# Patient Record
Sex: FEMALE | Race: BLACK OR AFRICAN AMERICAN | Employment: FULL TIME | URBAN - METROPOLITAN AREA
[De-identification: names, ages, dates, MRNs, and addresses within clinical notes are randomized per-mention and may not be internally consistent; named-entity substitution may affect disease eponyms.]

---

## 2017-01-03 ENCOUNTER — HOSPITAL ENCOUNTER (EMERGENCY)
Age: 27
Discharge: HOME | End: 2017-01-03
Attending: EMERGENCY MEDICINE
Payer: SELF-PAY

## 2017-01-03 VITALS
TEMPERATURE: 98.7 F | SYSTOLIC BLOOD PRESSURE: 111 MMHG | HEIGHT: 67 IN | DIASTOLIC BLOOD PRESSURE: 66 MMHG | HEART RATE: 70 BPM | WEIGHT: 195 LBS | BODY MASS INDEX: 30.61 KG/M2 | OXYGEN SATURATION: 98 % | RESPIRATION RATE: 19 BRPM

## 2017-01-03 DIAGNOSIS — R55 SYNCOPE, UNSPECIFIED SYNCOPE TYPE: Primary | ICD-10-CM

## 2017-01-03 DIAGNOSIS — Z91.89 H/O ANXIETY STATE: ICD-10-CM

## 2017-01-03 LAB
ANION GAP BLD CALC-SCNC: 20 MMOL/L (ref 5–15)
BASOPHILS # BLD AUTO: 0 K/UL (ref 0–0.1)
BASOPHILS # BLD: 0 % (ref 0–1)
BUN BLD-MCNC: 9 MG/DL (ref 9–20)
CA-I BLD-MCNC: 1.19 MMOL/L (ref 1.12–1.32)
CHLORIDE BLD-SCNC: 103 MMOL/L (ref 98–107)
CO2 BLD-SCNC: 24 MMOL/L (ref 21–32)
CREAT BLD-MCNC: 0.8 MG/DL (ref 0.6–1.3)
EOSINOPHIL # BLD: 0.1 K/UL (ref 0–0.4)
EOSINOPHIL NFR BLD: 1 % (ref 0–7)
ERYTHROCYTE [DISTWIDTH] IN BLOOD BY AUTOMATED COUNT: 14 % (ref 11.5–14.5)
GLUCOSE BLD-MCNC: 86 MG/DL (ref 65–105)
HCG SERPL QL: NEGATIVE
HCT VFR BLD AUTO: 32.8 % (ref 35–47)
HCT VFR BLD CALC: 36 % (ref 35–47)
HGB BLD-MCNC: 10.3 G/DL (ref 11.5–16)
HGB BLD-MCNC: 12.2 GM/DL (ref 11.5–16)
LYMPHOCYTES # BLD AUTO: 35 % (ref 12–49)
LYMPHOCYTES # BLD: 2.7 K/UL (ref 0.8–3.5)
MCH RBC QN AUTO: 26.7 PG (ref 26–34)
MCHC RBC AUTO-ENTMCNC: 31.4 G/DL (ref 30–36.5)
MCV RBC AUTO: 85 FL (ref 80–99)
MONOCYTES # BLD: 0.5 K/UL (ref 0–1)
MONOCYTES NFR BLD AUTO: 7 % (ref 5–13)
NEUTS SEG # BLD: 4.4 K/UL (ref 1.8–8)
NEUTS SEG NFR BLD AUTO: 57 % (ref 32–75)
PLATELET # BLD AUTO: 310 K/UL (ref 150–400)
POTASSIUM BLD-SCNC: 3.4 MMOL/L (ref 3.5–5.1)
RBC # BLD AUTO: 3.86 M/UL (ref 3.8–5.2)
SERVICE CMNT-IMP: ABNORMAL
SODIUM BLD-SCNC: 142 MMOL/L (ref 136–145)
WBC # BLD AUTO: 7.6 K/UL (ref 3.6–11)

## 2017-01-03 PROCEDURE — 85025 COMPLETE CBC W/AUTO DIFF WBC: CPT | Performed by: EMERGENCY MEDICINE

## 2017-01-03 PROCEDURE — 80047 BASIC METABLC PNL IONIZED CA: CPT

## 2017-01-03 PROCEDURE — 99285 EMERGENCY DEPT VISIT HI MDM: CPT

## 2017-01-03 PROCEDURE — 93005 ELECTROCARDIOGRAM TRACING: CPT

## 2017-01-03 PROCEDURE — 84703 CHORIONIC GONADOTROPIN ASSAY: CPT | Performed by: EMERGENCY MEDICINE

## 2017-01-03 PROCEDURE — 36415 COLL VENOUS BLD VENIPUNCTURE: CPT | Performed by: EMERGENCY MEDICINE

## 2017-01-03 NOTE — ED PROVIDER NOTES
HPI Comments: Kristy Boss is a 31 y.o. female with PMHx significant for panic attacks and anxiety who presents via EMS to the ED for evaluation after having a seizure and syncopal episode at work  PTA. Pt denies remembering passing out at her work or having the seizure. Pt reports that the last thing she remembers was having a panic attack at work due to anxiety and \"personal life issues. \" Per EMS, pt had fallen and was on the ground upon arrival. Pt reports that she used to see a psychiatrist for her anxiety and panic attacks, but notes that she does not follow up anymore for her anxiety or panic attacks. Pt denies currently taking any regular medications. Pt also denies eating or drinking today, noting that she \"went to her first job at 0600, and then was on the way to her second job at ~1200 when her sx's arose. \" Pt reports that she \"felt ok\" during her first job. Pt specifically denies urinary or fecal incontinence, headaches, ABD pain, dysuria, back pain, or neck pain. PCP: Hayden Chang MD      There are no other complaints, changes or physical findings at this time. The history is provided by the patient and the EMS personnel. No  was used. Past Medical History:   Diagnosis Date    Autoimmune disease ([address] Lupus     Psychiatric disorder      panic attacks       Past Surgical History:   Procedure Laterality Date    Pr breast surgery procedure unlisted       cyst removal    Pr abdomen surgery proc unlisted       hernia repair         History reviewed. No pertinent family history. Social History     Social History    Marital status: SINGLE     Spouse name: N/A    Number of children: N/A    Years of education: N/A     Occupational History    Not on file.      Social History Main Topics    Smoking status: Never Smoker    Smokeless tobacco: Not on file    Alcohol use Yes    Drug use: No    Sexual activity: No     Other Topics Concern    Not on file Social History Narrative         ALLERGIES: Penicillins    Review of Systems   Constitutional: Negative for chills and fever. HENT: Negative for congestion and rhinorrhea. Eyes: Negative for visual disturbance. Respiratory: Negative for cough and shortness of breath. Cardiovascular: Negative for chest pain. Gastrointestinal: Negative for abdominal pain, nausea and vomiting. No fecal incontinence. Genitourinary: Negative for difficulty urinating and dysuria. No urinary incontinence. Musculoskeletal: Negative for arthralgias, back pain and neck pain. Skin: Negative for color change and rash. Neurological: Positive for seizures and syncope. Negative for dizziness, weakness and headaches. Psychiatric/Behavioral: Negative for self-injury and suicidal ideas. The patient is nervous/anxious (anxiety induced panic attack). Vitals:    01/03/17 1740   BP: 111/66   Pulse: 70   Resp: 19   Temp: 98.7 °F (37.1 °C)   SpO2: 98%   Weight: 88.5 kg (195 lb)   Height: 5' 7\" (1.702 m)            Physical Exam   Constitutional: She is oriented to person, place, and time. She appears well-developed and well-nourished. She is active. Neck: Normal range of motion. Cardiovascular: Normal rate, regular rhythm, normal heart sounds and intact distal pulses. Pulmonary/Chest: Effort normal and breath sounds normal.   Abdominal: Soft. Bowel sounds are normal.   Neurological: She is alert and oriented to person, place, and time. No focal motor deficit. Skin: Skin is warm and dry. Psychiatric: Her mood appears anxious. Her speech is not rapid and/or pressured. She expresses no homicidal and no suicidal ideation. Nursing note and vitals reviewed.        MDM  Number of Diagnoses or Management Options  H/O anxiety state:   Syncope, unspecified syncope type:   Diagnosis management comments:     DDx: syncope, dehydration, metabolic derangement       Amount and/or Complexity of Data Reviewed  Tests in the medicine section of CPT®: ordered and reviewed  Obtain history from someone other than the patient: yes (EMS)  Review and summarize past medical records: yes    Patient Progress  Patient progress: stable    ED Course       Procedures       EKG interpretation: (Preliminary) at 1820  Rhythm: normal sinus rhythm; and regular . Rate (approx.): 66; Axis: normal; MN interval: normal; QRS interval: normal ; ST/T wave: normal;       LABORATORY TESTS:  Recent Results (from the past 12 hour(s))   CBC WITH AUTOMATED DIFF    Collection Time: 01/03/17  6:02 PM   Result Value Ref Range    WBC 7.6 3.6 - 11.0 K/uL    RBC 3.86 3.80 - 5.20 M/uL    HGB 10.3 (L) 11.5 - 16.0 g/dL    HCT 32.8 (L) 35.0 - 47.0 %    MCV 85.0 80.0 - 99.0 FL    MCH 26.7 26.0 - 34.0 PG    MCHC 31.4 30.0 - 36.5 g/dL    RDW 14.0 11.5 - 14.5 %    PLATELET 027 384 - 921 K/uL    NEUTROPHILS 57 32 - 75 %    LYMPHOCYTES 35 12 - 49 %    MONOCYTES 7 5 - 13 %    EOSINOPHILS 1 0 - 7 %    BASOPHILS 0 0 - 1 %    ABS. NEUTROPHILS 4.4 1.8 - 8.0 K/UL    ABS. LYMPHOCYTES 2.7 0.8 - 3.5 K/UL    ABS. MONOCYTES 0.5 0.0 - 1.0 K/UL    ABS. EOSINOPHILS 0.1 0.0 - 0.4 K/UL    ABS.  BASOPHILS 0.0 0.0 - 0.1 K/UL   HCG QL SERUM    Collection Time: 01/03/17  6:02 PM   Result Value Ref Range    HCG, Ql. NEGATIVE  NEG     POC CHEM8    Collection Time: 01/03/17  6:03 PM   Result Value Ref Range    Calcium, ionized (POC) 1.19 1.12 - 1.32 MMOL/L    Sodium (POC) 142 136 - 145 MMOL/L    Potassium (POC) 3.4 (L) 3.5 - 5.1 MMOL/L    Chloride (POC) 103 98 - 107 MMOL/L    CO2 (POC) 24 21 - 32 MMOL/L    Anion gap (POC) 20 (H) 5 - 15 mmol/L    Glucose (POC) 86 65 - 105 MG/DL    BUN (POC) 9 9 - 20 MG/DL    Creatinine (POC) 0.8 0.6 - 1.3 MG/DL    GFR-AA (POC) >60 >60 ml/min/1.73m2    GFR, non-AA (POC) >60 >60 ml/min/1.73m2    Hemoglobin (POC) 12.2 11.5 - 16.0 GM/DL    Hematocrit (POC) 36 35.0 - 47.0 %    Comment Notified RN or MD immediately by      EKG, 12 LEAD, INITIAL Collection Time: 01/03/17  6:20 PM   Result Value Ref Range    Ventricular Rate 66 BPM    Atrial Rate 66 BPM    P-R Interval 128 ms    QRS Duration 100 ms    Q-T Interval 406 ms    QTC Calculation (Bezet) 425 ms    Calculated P Axis 21 degrees    Calculated R Axis 14 degrees    Calculated T Axis 22 degrees    Diagnosis       Normal sinus rhythm  Normal ECG  When compared with ECG of 23-APR-2015 09:42,  No significant change was found         IMAGING RESULTS:  No orders to display       MEDICATIONS GIVEN:  Medications - No data to display    IMPRESSION:  1. Syncope, unspecified syncope type    2. H/O anxiety state        PLAN:  1. Current Discharge Medication List      CONTINUE these medications which have NOT CHANGED    Details   SERTRALINE HCL (ZOLOFT PO) Take  by mouth. CELECOXIB (CELEBREX PO) Take  by mouth. ESOMEPRAZOLE MAGNESIUM (NEXIUM PO) Take  by mouth. ibuprofen (MOTRIN) 600 mg tablet Take 1 Tab by mouth every six (6) hours as needed for Pain. Qty: 20 Tab, Refills: 0      traMADol (ULTRAM) 50 mg tablet Take 1 Tab by mouth every six (6) hours as needed for Pain. Max Daily Amount: 200 mg. Qty: 20 Tab, Refills: 0      hydrocodone-acetaminophen (LORTAB) 5-500 mg per tablet Take  by mouth. butalbital-acetaminophen-caffeine (FIORICET) -40 mg per tablet Take 1-2 Tabs by mouth every four (4) hours as needed for Pain. Qty: 20 Tab, Refills: 0           2.    Follow-up Information     Follow up With Details Comments Contact Info    [facility] EMERGENCY DEPT  If symptoms worsen [address] [facility]    Juliana Maki MD Call in 1 day  [address]  [address] [facility] Call in 1 day for help with anxiety [address] [facility]  [phone]    [facility] Schedule an appointment as soon as possible for a visit  [address] [facility] [place]   [phone]        Return to ED if worse     DISCHARGE NOTE  7:20 PM  The patient has been re-evaluated and is ready for discharge. Reviewed available results with patient. Counseled pt on diagnosis and care plan. Pt has expressed understanding, and all questions have been answered. Pt agrees with plan and agrees to F/U as recommended, or return to the ED if their sxs worsen. Discharge instructions have been provided and explained to the pt, along with reasons to return to the ED. Written by Wandy Whitman, ED Scribe, as dictated by Ranjit Valdez MD.  This note is prepared by Wandy Whitman, acting as Scribe for Ranjit Valdez MD.    Ranjit Valdez MD: The scribe's documentation has been prepared under my direction and personally reviewed by me in its entirety. I confirm that the note above accurately reflects all work, treatment, procedures, and medical decision making performed by me.

## 2017-01-03 NOTE — LETTER
[facility] EMERGENCY DEPT 
[address] [facility] [address] [address] 
[phone] Work/School Note Date: 1/3/2017 To Whom It May concern: 
 
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 
Attending Provider: Paulino Severe, MD.   
 
Home Gaitan may return to work on 1/7/17. Sincerely, Paulino Severe, MD

## 2017-01-04 LAB
ATRIAL RATE: 66 BPM
CALCULATED P AXIS, ECG09: 21 DEGREES
CALCULATED R AXIS, ECG10: 14 DEGREES
CALCULATED T AXIS, ECG11: 22 DEGREES
DIAGNOSIS, 93000: NORMAL
P-R INTERVAL, ECG05: 128 MS
Q-T INTERVAL, ECG07: 406 MS
QRS DURATION, ECG06: 100 MS
QTC CALCULATION (BEZET), ECG08: 425 MS
VENTRICULAR RATE, ECG03: 66 BPM

## 2017-01-04 NOTE — ED NOTES
Discharge Instructions Reviewed with patient. Discharge instructions given to patient per Dr. Tree King. patient able to return verbalize discharge instructions. Paper copy of discharge instructions given. 0 RX given to patient per Dr. Tree King. Patient condition stable, Respiratory status WNL, Neurostatus intact. patient out of er, to home with self.

## 2017-01-04 NOTE — DISCHARGE INSTRUCTIONS
Fainting: Care Instructions  Your Care Instructions    When you faint, or pass out, you lose consciousness for a short time. A brief drop in blood flow to the brain often causes it. When you fall or lie down, more blood flows to your brain and you regain consciousness. Emotional stress, pain, or overheating--especially if you have been standing--can make you faint. In these cases, fainting is usually not serious. But fainting can be a sign of a more serious problem. Your doctor may want you to have more tests to rule out other causes. The treatment you need depends on the reason why you fainted. The doctor has checked you carefully, but problems can develop later. If you notice any problems or new symptoms, get medical treatment right away. Follow-up care is a key part of your treatment and safety. Be sure to make and go to all appointments, and call your doctor if you are having problems. It's also a good idea to know your test results and keep a list of the medicines you take. How can you care for yourself at home? · Drink plenty of fluids to prevent dehydration. If you have kidney, heart, or liver disease and have to limit fluids, talk with your doctor before you increase your fluid intake. When should you call for help? Call 911 anytime you think you may need emergency care. For example, call if:  · You have symptoms of a heart problem. These may include:  ¨ Chest pain or pressure. ¨ Severe trouble breathing. ¨ A fast or irregular heartbeat. ¨ Lightheadedness or sudden weakness. ¨ Coughing up pink, foamy mucus. ¨ Passing out. After you call 911, the  may tell you to chew 1 adult-strength or 2 to 4 low-dose aspirin. Wait for an ambulance. Do not try to drive yourself. · You have symptoms of a stroke. These may include:  ¨ Sudden numbness, tingling, weakness, or loss of movement in your face, arm, or leg, especially on only one side of your body. ¨ Sudden vision changes.   ¨ Sudden trouble speaking. ¨ Sudden confusion or trouble understanding simple statements. ¨ Sudden problems with walking or balance. ¨ A sudden, severe headache that is different from past headaches. · You passed out (lost consciousness) again. Watch closely for changes in your health, and be sure to contact your doctor if:  · You do not get better as expected. Where can you learn more? Go to http://jin-solo.info/. Enter I869 in the search box to learn more about \"Fainting: Care Instructions. \"  Current as of: May 27, 2016  Content Version: 11.1  © 1596-4519 [employer]. Care instructions adapted under license by [employer] (which disclaims liability or warranty for this information). If you have questions about a medical condition or this instruction, always ask your healthcare professional. [address] any warranty or liability for your use of this information. Learning About Anxiety Disorders  What are anxiety disorders? Anxiety disorders are a type of medical problem. They cause severe anxiety. When you feel anxious, you feel that something bad is about to happen. This feeling interferes with your life. These disorders include:  · Generalized anxiety disorder. You feel worried and stressed about many everyday events and activities. This goes on for several months and disrupts your life on most days. · Panic disorder. You have repeated panic attacks. A panic attack is a sudden, intense fear or anxiety. It may make you feel short of breath. Your heart may pound. · Social anxiety disorder. You feel very anxious about what you will say or do in front of people. For example, you may be scared to talk or eat in public. This problem affects your daily life. · Phobias. You are very scared of a specific object, situation, or activity. For example, you may fear spiders, high places, or small spaces. What are the symptoms?   Generalized anxiety disorder  Symptoms may include:  · Feeling worried and stressed about many things almost every day. · Feeling tired or irritable. You may have a hard time concentrating. · Having headaches or muscle aches. · Having a hard time swallowing. · Feeling shaky, sweating, or having hot flashes. Panic disorder  You may have repeated panic attacks when there is no reason for feeling afraid. You may change your daily activities because you worry that you will have another attack. Symptoms may include:  · Intense fear, terror, or anxiety. · Trouble breathing or very fast breathing. · Chest pain or tightness. · A heartbeat that races or is not regular. Social anxiety disorder  Symptoms may include:  · Fear about a social situation, such as eating in front of others or speaking in public. You may worry a lot. Or you may be afraid that something bad will happen. · Anxiety that can cause you to blush, sweat, and feel shaky. · A heartbeat that is faster than normal.  · A hard time focusing. Phobias  Symptoms may include:  · More fear than most people of being around an object, being in a situation, or doing an activity. You might also be stressed about the chance of being around the thing you fear. · Worry about losing control, panicking, fainting, or having physical symptoms like a faster heartbeat when you are around the situation or object. How are these disorders treated? Anxiety disorders can be treated with medicines or counseling. A combination of both may be used. Medicines may include:  · Antidepressants. These may help your symptoms by keeping chemicals in your brain in balance. · Benzodiazepines. These may give you short-term relief of your symptoms. Some people use cognitive-behavioral therapy. A therapist helps you learn to change stressful or bad thoughts into helpful thoughts. Lead a healthy lifestyle  A healthy lifestyle may help you feel better.   · Get at least 30 minutes of exercise on most days of the week. Walking is a good choice. · Eat a healthy diet. Include fruits, vegetables, lean proteins, and whole grains in your diet each day. · Try to go to bed at the same time every night. Try for 8 hours of sleep a night. · Find ways to manage stress. Try relaxation exercises. · Avoid alcohol and illegal drugs. Follow-up care is a key part of your treatment and safety. Be sure to make and go to all appointments, and call your doctor if you are having problems. It's also a good idea to know your test results and keep a list of the medicines you take. Where can you learn more? Go to http://jin-solo.info/. Enter R431 in the search box to learn more about \"Learning About Anxiety Disorders. \"  Current as of: July 26, 2016  Content Version: 11.1  © 6446-1492 [employer]. Care instructions adapted under license by [employer] (which disclaims liability or warranty for this information). If you have questions about a medical condition or this instruction, always ask your healthcare professional. [address] any warranty or liability for your use of this information.

## 2017-03-22 ENCOUNTER — HOSPITAL ENCOUNTER (EMERGENCY)
Age: 27
Discharge: HOME | End: 2017-03-22
Attending: EMERGENCY MEDICINE
Payer: SELF-PAY

## 2017-03-22 VITALS
SYSTOLIC BLOOD PRESSURE: 105 MMHG | DIASTOLIC BLOOD PRESSURE: 63 MMHG | HEART RATE: 100 BPM | OXYGEN SATURATION: 95 % | HEIGHT: 67 IN | TEMPERATURE: 98.3 F | RESPIRATION RATE: 20 BRPM

## 2017-03-22 DIAGNOSIS — F41.0 PANIC ANXIETY SYNDROME: Primary | ICD-10-CM

## 2017-03-22 DIAGNOSIS — F23 PSYCHOSIS DUE TO EMOTIONAL STRESS (HCC): ICD-10-CM

## 2017-03-22 PROCEDURE — 90791 PSYCH DIAGNOSTIC EVALUATION: CPT

## 2017-03-22 PROCEDURE — 80047 BASIC METABLC PNL IONIZED CA: CPT

## 2017-03-22 PROCEDURE — 74011250636 HC RX REV CODE- 250/636

## 2017-03-22 PROCEDURE — 99284 EMERGENCY DEPT VISIT MOD MDM: CPT

## 2017-03-22 PROCEDURE — 96374 THER/PROPH/DIAG INJ IV PUSH: CPT

## 2017-03-22 RX ORDER — LORAZEPAM 2 MG/ML
2 INJECTION INTRAMUSCULAR ONCE
Status: COMPLETED | OUTPATIENT
Start: 2017-03-22 | End: 2017-03-22

## 2017-03-22 RX ORDER — WATER FOR INJECTION,STERILE
VIAL (ML) INJECTION
Status: DISCONTINUED
Start: 2017-03-22 | End: 2017-03-22 | Stop reason: HOSPADM

## 2017-03-22 RX ORDER — LORAZEPAM 2 MG/ML
INJECTION INTRAMUSCULAR
Status: COMPLETED
Start: 2017-03-22 | End: 2017-03-22

## 2017-03-22 RX ORDER — LORAZEPAM 2 MG/1
2 TABLET ORAL
Qty: 35 TAB | Refills: 0 | Status: SHIPPED | OUTPATIENT
Start: 2017-03-22 | End: 2021-07-28

## 2017-03-22 RX ADMIN — Medication 2 MG: at 18:45

## 2017-03-22 RX ADMIN — LORAZEPAM 2 MG: 2 INJECTION INTRAMUSCULAR at 18:45
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
## 2017-03-22 NOTE — ED NOTES
MD entered room during triage, and pt became extremely anxious and agitated- throwing herself around in the bed. Multiple RNs entered room d/t pt screaming. Pt attempting to hit head on side rail, tech attempting to keep pts head from hitting rail. Pt grabbing at tech and screaming and kicking.

## 2017-03-22 NOTE — LETTER
[address] 
[address] [address] 
[phone] Work/School Note Date: 3/22/2017 To Whom It May concern: 
 
Nany Evans was seen and treated today in the emergency room by the following provider(s): 
Attending Provider: Deshawn Silva MD.   
 
Nany Evans may return to work on 3/27/17. Sincerely, Deshawn Silva MD
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
## 2017-03-23 NOTE — DISCHARGE INSTRUCTIONS
Panic Attacks: Care Instructions  Your Care Instructions  During a panic attack, you may have a feeling of intense fear or terror, trouble breathing, chest pain or tightness, heartbeat changes, dizziness, sweating, and shaking. A panic attack starts suddenly and usually lasts from 5 to 20 minutes but may last even longer. You have the most anxiety about 10 minutes after the attack starts. An attack can begin with a stressful event, or it can happen without a cause. Although panic attacks can cause scary symptoms, you can learn to manage them with self-care, counseling, and medicine. Follow-up care is a key part of your treatment and safety. Be sure to make and go to all appointments, and call your doctor if you are having problems. It's also a good idea to know your test results and keep a list of the medicines you take. How can you care for yourself at home? · Take your medicine exactly as directed. Call your doctor if you think you are having a problem with your medicine. · Go to your counseling sessions and follow-up appointments. · Recognize and accept your anxiety. Then, when you are in a situation that makes you anxious, say to yourself, \"This is not an emergency. I feel uncomfortable, but I am not in danger. I can keep going even if I feel anxious. \"  · Be kind to your body:  ¨ Relieve tension with exercise or a massage. ¨ Get enough rest.  ¨ Avoid alcohol, caffeine, nicotine, and illegal drugs. They can increase your anxiety level, cause sleep problems, or trigger a panic attack. ¨ Learn and do relaxation techniques. See below for more about these techniques. · Engage your mind. Get out and do something you enjoy. Go to a funny movie, or take a walk or hike. Plan your day. Having too much or too little to do can make you anxious. · Keep a record of your symptoms. Discuss your fears with a good friend or family member, or join a support group for people with similar problems.  Talking to others sometimes relieves stress. · Get involved in social groups, or volunteer to help others. Being alone sometimes makes things seem worse than they are. · Get at least 30 minutes of exercise on most days of the week to relieve stress. Walking is a good choice. You also may want to do other activities, such as running, swimming, cycling, or playing tennis or team sports. Relaxation techniques  Do relaxation exercises for 10 to 20 minutes a day. You can play soothing, relaxing music while you do them, if you wish. · Tell others in your house that you are going to do your relaxation exercises. Ask them not to disturb you. · Find a comfortable place, away from all distractions and noise. · Lie down on your back, or sit with your back straight. · Focus on your breathing. Make it slow and steady. · Breathe in through your nose. Breathe out through either your nose or mouth. · Breathe deeply, filling up the area between your navel and your rib cage. Breathe so that your belly goes up and down. · Do not hold your breath. · Breathe like this for 5 to 10 minutes. Notice the feeling of calmness throughout your whole body. As you continue to breathe slowly and deeply, relax by doing the following for another 5 to 10 minutes:  · Tighten and relax each muscle group in your body. You can begin at your toes and work your way up to your head. · Imagine your muscle groups relaxing and becoming heavy. · Empty your mind of all thoughts. · Let yourself relax more and more deeply. · Become aware of the state of calmness that surrounds you. · When your relaxation time is over, you can bring yourself back to alertness by moving your fingers and toes and then your hands and feet and then stretching and moving your entire body. Sometimes people fall asleep during relaxation, but they usually wake up shortly afterward.   · Always give yourself time to return to full alertness before you drive a car or do anything that might cause an accident if you are not fully alert. Never play a relaxation tape while driving a car. When should you call for help? Call 911 anytime you think you may need emergency care. For example, call if:  · You feel you cannot stop from hurting yourself or someone else. Watch closely for changes in your health, and be sure to contact your doctor if:  · Your panic attacks get worse. · You have new or different anxiety. · You are not getting better as expected. Where can you learn more? Go to http://jin-solo.info/. Enter H601 in the search box to learn more about \"Panic Attacks: Care Instructions. \"  Current as of: July 26, 2016  Content Version: 11.1  © 2485-4056 [employer]. Care instructions adapted under license by [employer] (which disclaims liability or warranty for this information). If you have questions about a medical condition or this instruction, always ask your healthcare professional. [place] [address] any warranty or liability for your use of this information. We hope that we have addressed all of your medical concerns. The examination and treatment you received in the Emergency Department were for an emergent problem and were not intended as complete care. It is important that you follow up with your healthcare provider(s) for ongoing care. If your symptoms worsen or do not improve as expected, and you are unable to reach your usual health care provider(s), you should return to the Emergency Department. Today's healthcare is undergoing tremendous change, and patient satisfaction surveys are one of the many tools to assess the quality of medical care. You may receive a survey from the [employer] organization regarding your experience in the Emergency Department. I hope that your experience has been completely positive, particularly the medical care that I provided.   As such, please participate in the survey; anything less than excellent does not meet my expectations or intentions. [facility] and [facility] participate in nationally recognized quality of care measures. If your blood pressure is greater than 120/80, as reported below, we urge that you seek medical care to address the potential of high blood pressure, commonly known as hypertension. Hypertension can be hereditary or can be caused by certain medical conditions, pain, stress, or \"white coat syndrome. \"       Please make an appointment with your health care provider(s) for follow up of your Emergency Department visit. VITALS:   Patient Vitals for the past 8 hrs:   Temp Pulse Resp BP SpO2   03/22/17 1853 98.5 °F (36.9 °C) (!) 104 24 115/58 98 %          Thank you for allowing us to provide you with medical care today. We realize that you have many choices for your emergency care needs. Please choose us in the future for any continued health care needs. Norm Gutting [facility]: [phone]            No results found for this or any previous visit (from the past 24 hour(s)). No results found.

## 2019-01-15 ENCOUNTER — APPOINTMENT (OUTPATIENT)
Dept: CT IMAGING | Age: 29
End: 2019-01-15
Attending: EMERGENCY MEDICINE
Payer: SELF-PAY

## 2019-01-15 ENCOUNTER — HOSPITAL ENCOUNTER (EMERGENCY)
Age: 29
Discharge: HOME | End: 2019-01-16
Attending: EMERGENCY MEDICINE
Payer: SELF-PAY

## 2019-01-15 DIAGNOSIS — R07.9 CHEST PAIN, UNSPECIFIED TYPE: Primary | ICD-10-CM

## 2019-01-15 LAB
ALBUMIN SERPL-MCNC: 3.4 G/DL (ref 3.5–5)
ALBUMIN/GLOB SERPL: 0.8 {RATIO} (ref 1.1–2.2)
ALP SERPL-CCNC: 65 U/L (ref 45–117)
ALT SERPL-CCNC: 12 U/L (ref 12–78)
ANION GAP SERPL CALC-SCNC: 7 MMOL/L (ref 5–15)
APPEARANCE UR: ABNORMAL
AST SERPL-CCNC: 11 U/L (ref 15–37)
BACTERIA URNS QL MICRO: NEGATIVE /HPF
BASOPHILS # BLD: 0 K/UL (ref 0–0.1)
BASOPHILS NFR BLD: 0 % (ref 0–1)
BILIRUB SERPL-MCNC: 0.2 MG/DL (ref 0.2–1)
BILIRUB UR QL: NEGATIVE
BUN SERPL-MCNC: 11 MG/DL (ref 6–20)
BUN/CREAT SERPL: 12 (ref 12–20)
CALCIUM SERPL-MCNC: 9 MG/DL (ref 8.5–10.1)
CHLORIDE SERPL-SCNC: 106 MMOL/L (ref 97–108)
CO2 SERPL-SCNC: 24 MMOL/L (ref 21–32)
COLOR UR: ABNORMAL
COMMENT, HOLDF: NORMAL
CREAT SERPL-MCNC: 0.92 MG/DL (ref 0.55–1.02)
D DIMER PPP FEU-MCNC: 0.73 MG/L FEU (ref 0–0.65)
DIFFERENTIAL METHOD BLD: ABNORMAL
EOSINOPHIL # BLD: 0.1 K/UL (ref 0–0.4)
EOSINOPHIL NFR BLD: 1 % (ref 0–7)
EPITH CASTS URNS QL MICRO: ABNORMAL /LPF
ERYTHROCYTE [DISTWIDTH] IN BLOOD BY AUTOMATED COUNT: 15.7 % (ref 11.5–14.5)
GLOBULIN SER CALC-MCNC: 4.2 G/DL (ref 2–4)
GLUCOSE SERPL-MCNC: 93 MG/DL (ref 65–100)
GLUCOSE UR STRIP.AUTO-MCNC: NEGATIVE MG/DL
HCG UR QL: NEGATIVE
HCT VFR BLD AUTO: 34 % (ref 35–47)
HGB BLD-MCNC: 10.3 G/DL (ref 11.5–16)
HGB UR QL STRIP: NEGATIVE
IMM GRANULOCYTES # BLD AUTO: 0 K/UL (ref 0–0.04)
IMM GRANULOCYTES NFR BLD AUTO: 0 % (ref 0–0.5)
KETONES UR QL STRIP.AUTO: NEGATIVE MG/DL
LEUKOCYTE ESTERASE UR QL STRIP.AUTO: ABNORMAL
LIPASE SERPL-CCNC: 132 U/L (ref 73–393)
LYMPHOCYTES # BLD: 2.5 K/UL (ref 0.8–3.5)
LYMPHOCYTES NFR BLD: 34 % (ref 12–49)
MCH RBC QN AUTO: 25.6 PG (ref 26–34)
MCHC RBC AUTO-ENTMCNC: 30.3 G/DL (ref 30–36.5)
MCV RBC AUTO: 84.6 FL (ref 80–99)
MONOCYTES # BLD: 0.6 K/UL (ref 0–1)
MONOCYTES NFR BLD: 8 % (ref 5–13)
MUCOUS THREADS URNS QL MICRO: ABNORMAL /LPF
NEUTS SEG # BLD: 4.1 K/UL (ref 1.8–8)
NEUTS SEG NFR BLD: 56 % (ref 32–75)
NITRITE UR QL STRIP.AUTO: NEGATIVE
NRBC # BLD: 0 K/UL (ref 0–0.01)
NRBC BLD-RTO: 0 PER 100 WBC
PH UR STRIP: 5.5 [PH] (ref 5–8)
PLATELET # BLD AUTO: 327 K/UL (ref 150–400)
PMV BLD AUTO: 10.1 FL (ref 8.9–12.9)
POTASSIUM SERPL-SCNC: 3.6 MMOL/L (ref 3.5–5.1)
PROT SERPL-MCNC: 7.6 G/DL (ref 6.4–8.2)
PROT UR STRIP-MCNC: NEGATIVE MG/DL
RBC # BLD AUTO: 4.02 M/UL (ref 3.8–5.2)
RBC #/AREA URNS HPF: ABNORMAL /HPF (ref 0–5)
SAMPLES BEING HELD,HOLD: NORMAL
SODIUM SERPL-SCNC: 137 MMOL/L (ref 136–145)
SP GR UR REFRACTOMETRY: 1.03 (ref 1–1.03)
TROPONIN I SERPL-MCNC: <0.05 NG/ML
UR CULT HOLD, URHOLD: NORMAL
UROBILINOGEN UR QL STRIP.AUTO: 0.2 EU/DL (ref 0.2–1)
WBC # BLD AUTO: 7.2 K/UL (ref 3.6–11)
WBC URNS QL MICRO: ABNORMAL /HPF (ref 0–4)

## 2019-01-15 PROCEDURE — 80053 COMPREHEN METABOLIC PANEL: CPT

## 2019-01-15 PROCEDURE — 93005 ELECTROCARDIOGRAM TRACING: CPT

## 2019-01-15 PROCEDURE — 81025 URINE PREGNANCY TEST: CPT

## 2019-01-15 PROCEDURE — 85025 COMPLETE CBC W/AUTO DIFF WBC: CPT

## 2019-01-15 PROCEDURE — 71275 CT ANGIOGRAPHY CHEST: CPT

## 2019-01-15 PROCEDURE — 83690 ASSAY OF LIPASE: CPT

## 2019-01-15 PROCEDURE — 36415 COLL VENOUS BLD VENIPUNCTURE: CPT

## 2019-01-15 PROCEDURE — 74011250637 HC RX REV CODE- 250/637: Performed by: EMERGENCY MEDICINE

## 2019-01-15 PROCEDURE — 74011000250 HC RX REV CODE- 250: Performed by: EMERGENCY MEDICINE

## 2019-01-15 PROCEDURE — 84484 ASSAY OF TROPONIN QUANT: CPT

## 2019-01-15 PROCEDURE — 81001 URINALYSIS AUTO W/SCOPE: CPT

## 2019-01-15 PROCEDURE — 85379 FIBRIN DEGRADATION QUANT: CPT

## 2019-01-15 PROCEDURE — 99284 EMERGENCY DEPT VISIT MOD MDM: CPT

## 2019-01-15 RX ORDER — GUAIFENESIN 100 MG/5ML
325 LIQUID (ML) ORAL
Status: COMPLETED | OUTPATIENT
Start: 2019-01-15 | End: 2019-01-15

## 2019-01-15 RX ORDER — SODIUM CHLORIDE 0.9 % (FLUSH) 0.9 %
10 SYRINGE (ML) INJECTION
Status: COMPLETED | OUTPATIENT
Start: 2019-01-15 | End: 2019-01-16

## 2019-01-15 RX ADMIN — LIDOCAINE HYDROCHLORIDE 40 ML: 20 SOLUTION ORAL; TOPICAL at 23:06

## 2019-01-15 RX ADMIN — ASPIRIN 81 MG CHEWABLE TABLET 324 MG: 81 TABLET CHEWABLE at 23:07

## 2019-01-15 NOTE — LETTER
[address] 
[address] [facility] [address] [id number] 
[phone] Work/School Note Date: 1/15/2019 To Whom It May concern: 
 
Denny Patterson was seen and treated today in the emergency room by the following provider(s): 
Attending Provider: Juana Lobo MD.   
 
 
 
Sincerely, 
 
 
 
 
Ania Michel MD

## 2019-01-16 VITALS
BODY MASS INDEX: 33.74 KG/M2 | RESPIRATION RATE: 16 BRPM | TEMPERATURE: 97.8 F | HEART RATE: 64 BPM | SYSTOLIC BLOOD PRESSURE: 96 MMHG | HEIGHT: 67 IN | DIASTOLIC BLOOD PRESSURE: 60 MMHG | OXYGEN SATURATION: 99 % | WEIGHT: 215 LBS

## 2019-01-16 LAB
ATRIAL RATE: 70 BPM
CALCULATED P AXIS, ECG09: -24 DEGREES
CALCULATED R AXIS, ECG10: -25 DEGREES
CALCULATED T AXIS, ECG11: -22 DEGREES
DIAGNOSIS, 93000: NORMAL
P-R INTERVAL, ECG05: 136 MS
Q-T INTERVAL, ECG07: 382 MS
QRS DURATION, ECG06: 88 MS
QTC CALCULATION (BEZET), ECG08: 412 MS
VENTRICULAR RATE, ECG03: 70 BPM

## 2019-01-16 PROCEDURE — 74011000258 HC RX REV CODE- 258: Performed by: RADIOLOGY

## 2019-01-16 PROCEDURE — 74011250636 HC RX REV CODE- 250/636: Performed by: EMERGENCY MEDICINE

## 2019-01-16 PROCEDURE — 74011636320 HC RX REV CODE- 636/320: Performed by: RADIOLOGY

## 2019-01-16 RX ADMIN — IOPAMIDOL 80 ML: 755 INJECTION, SOLUTION INTRAVENOUS at 00:03

## 2019-01-16 RX ADMIN — SODIUM CHLORIDE 100 ML: 900 INJECTION, SOLUTION INTRAVENOUS at 00:03

## 2019-01-16 RX ADMIN — Medication 10 ML: at 00:03

## 2019-01-16 RX ADMIN — SODIUM CHLORIDE 500 ML: 900 INJECTION, SOLUTION INTRAVENOUS at 00:25

## 2019-01-16 NOTE — ED NOTES
Discharge instructions given to patient by MD/RN. Patient verbalizes understanding of discharge instructions and medications. IV discontinued. Questions answered. Patient ambulated off unit with no signs of acute distress. Home to self.

## 2019-01-16 NOTE — ED PROVIDER NOTES
'been having CP all day - right here (points to lower chest/ epigastric)/ 'sharp - like a stick poking me'/ constant/ unchanged w/ food/ position/ 'was drivingh to work and had to pull over twice itr got so bad/ I then called 911 the 2nd time'; pt denies HA, vison changes, diff swallowing, SOB, Abd pain, F/Ch, N/V, D/Cons or other current systemic complaints Pt adds 'had a similar episode 4months ago and was seen at [address] they told me nothing was wrong'; The history is provided by the patient and the EMS personnel. Chest Pain This is a recurrent problem. The current episode started 6 to 12 hours ago. The problem has not changed since onset. The problem occurs rarely. The pain is associated with normal activity. The pain is present in the epigastric region. The pain is moderate. The quality of the pain is described as sharp, pleuritic and tightness. The pain does not radiate. Exacerbated by: 'not sure' Associated symptoms include abdominal pain and dizziness. Pertinent negatives include no back pain, no claudication, no cough, no diaphoresis, no exertional chest pressure, no fever, no headaches, no hemoptysis, no irregular heartbeat, no leg pain, no lower extremity edema, no malaise/fatigue, no nausea, no near-syncope, no numbness, no orthopnea, no palpitations, no PND, no shortness of breath, no sputum production, no vomiting and no weakness. She has tried aspirin for the symptoms. The treatment provided no relief. 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 Past workup comments: denies prior procedural evaluation; . Past Medical History:  
Diagnosis Date  Autoimmune disease ([facility] [address] Lupus  Psychiatric disorder   
 panic attacks Past Surgical History:  
Procedure Laterality Date  ABDOMEN SURGERY PROC UNLISTED    
 hernia repair  BREAST SURGERY PROCEDURE UNLISTED    
 cyst removal  
 
   
No family history on file. Social History Socioeconomic History  Marital status: SINGLE Spouse name: Not on file  Number of children: Not on file  Years of education: Not on file  Highest education level: Not on file Social Needs  Financial resource strain: Not on file  Food insecurity - worry: Not on file  Food insecurity - inability: Not on file  Transportation needs - medical: Not on file  Transportation needs - non-medical: Not on file Occupational History  Not on file Tobacco Use  Smoking status: Never Smoker Substance and Sexual Activity  Alcohol use: Yes  Drug use: No  
 Sexual activity: No  
Other Topics Concern  Not on file Social History Narrative  Not on file ALLERGIES: Penicillins Review of Systems Constitutional: Negative for diaphoresis, fever and malaise/fatigue. Respiratory: Negative for cough, hemoptysis, sputum production and shortness of breath. Cardiovascular: Positive for chest pain. Negative for palpitations, orthopnea, claudication, PND and near-syncope. Gastrointestinal: Positive for abdominal pain. Negative for nausea and vomiting. Musculoskeletal: Negative for back pain. Neurological: Positive for dizziness. Negative for weakness, numbness and headaches. Vitals:  
 01/15/19 2213 BP: 112/70 Pulse: 70 Resp: 16 Temp: 97.4 °F (36.3 °C) SpO2: 97% Weight: 97.5 kg (215 lb) Height: 5' 7\" (1.702 m) Physical Exam  
Constitutional: She is oriented to person, place, and time. She appears well-developed and well-nourished. No distress. NAD, AxOx4, speaking in complete sentences HENT:  
Head: Normocephalic and atraumatic. Right Ear: External ear normal.  
Left Ear: External ear normal.  
Mouth/Throat: Oropharynx is clear and moist.  
Cn intact Eyes: Conjunctivae and EOM are normal. Pupils are equal, round, and reactive to light. Right eye exhibits no discharge.  Left eye exhibits no discharge. No scleral icterus. Neck: Normal range of motion. Neck supple. No JVD present. No tracheal deviation present. Cardiovascular: Normal rate, regular rhythm, normal heart sounds and intact distal pulses. Exam reveals no gallop and no friction rub. No murmur heard. Pulmonary/Chest: Effort normal and breath sounds normal. No respiratory distress. She has no wheezes. She has no rales. She exhibits no tenderness. Abdominal: Soft. Bowel sounds are normal. There is tenderness. There is no rebound and no guarding. Mid-epigastric min ttp Neg peritoneal signs Genitourinary: No vaginal discharge found. Genitourinary Comments: Pt denies urinary/ vaginal complaints Musculoskeletal: Normal range of motion. She exhibits no edema or tenderness. Neurological: She is alert and oriented to person, place, and time. No cranial nerve deficit. She exhibits normal muscle tone. Coordination normal.  
pt has motor/ CV/ Sensation grossly intact to all extremities, R = L in strength;  
Skin: Skin is warm and dry. Capillary refill takes less than 2 seconds. No rash noted. No erythema. No pallor. Psychiatric: She has a normal mood and affect. Her behavior is normal. Thought content normal.  
Nursing note and vitals reviewed. MDM Procedures No chief complaint on file. 10:06 PM 
The patients presenting problems have been discussed, and they are in agreement with the care plan formulated and outlined with them. I have encouraged them to ask questions as they arise throughout their visit. MEDICATIONS GIVEN: 
Medications - No data to display LABS REVIEWED: 
Labs Reviewed - No data to display RADIOLOGY RESULTS: 
The following have been ordered and reviewed: 
_____________________________________________________________________ 
_____________________________________________________________________ EKG interpretation:  
Rhythm: normal sinus rhythm; and regular . Rate (approx.): 70;  Axis: normal; P wave: normal; QRS interval: normal ; ST/T wave: normal; Negative acute significant segmental elevations/ unchanged compared to study dated 01/03/2017 PROCEDURES: 
 
 
 
CONSULTATIONS:  
 
 
PROGRESS NOTES: 
 
DIAGNOSIS: 
 
1. Chest pain, unspecified type PLAN: 
1-Chest Pain / SOB / neg ed evaluation - will have pt follow-up with Cardiology;  
 
 
ED COURSE: The patients hospital course has been uncomplicated. 11:23 PM 
'doing better'; agrees w/ CT-PE 
  
 
12:26 AM 
W[employer]y [employer]  results have been reviewed with her. She has been counseled regarding her diagnosis. She verbally conveys understanding and agreement of the signs, symptoms, diagnosis, treatment and prognosis and additionally agrees to Call/ Arrange follow up as recommended with sEther Valdes MD in 24 - 48 hours. She also agrees with the care-plan and conveys that all of her questions have been answered. I have also put together some discharge instructions for her that include: 1) educational information regarding their diagnosis, 2) how to care for their diagnosis at home, as well a 3) list of reasons why they would want to return to the ED prior to their follow-up appointment, should their condition change or for concerns.

## 2019-01-16 NOTE — DISCHARGE INSTRUCTIONS

## 2020-12-16 NOTE — ED TRIAGE NOTES
Patient calling for medication refill.   Medication(s) set up as pending orders from medication list.    Preferred pharmacy is set up and verified.    Patient told to check with pharmacy after 48 hours.    Patient was advised they would be notified only if there is a problem concerning the refill.     Patient has 2 pills left.   Pt arrives from home with complaints of midline c/p that started around 1600 to day. Pt states its been about 4 months since she has pain similar and primary care could not pinpoint issue. Pt state she's is having some sob but denies n/v/d. Pt a/o x4. ekg NSR. Vitals stable.

## 2021-07-28 ENCOUNTER — APPOINTMENT (OUTPATIENT)
Dept: GENERAL RADIOLOGY | Age: 31
End: 2021-07-28
Attending: STUDENT IN AN ORGANIZED HEALTH CARE EDUCATION/TRAINING PROGRAM
Payer: MEDICAID

## 2021-07-28 ENCOUNTER — HOSPITAL ENCOUNTER (EMERGENCY)
Age: 31
Discharge: HOME | End: 2021-07-28
Attending: STUDENT IN AN ORGANIZED HEALTH CARE EDUCATION/TRAINING PROGRAM
Payer: MEDICAID

## 2021-07-28 VITALS
RESPIRATION RATE: 16 BRPM | WEIGHT: 251.77 LBS | OXYGEN SATURATION: 100 % | DIASTOLIC BLOOD PRESSURE: 88 MMHG | BODY MASS INDEX: 39.43 KG/M2 | HEART RATE: 87 BPM | SYSTOLIC BLOOD PRESSURE: 124 MMHG | TEMPERATURE: 98.9 F

## 2021-07-28 DIAGNOSIS — U07.1 COVID-19 VIRUS INFECTION: Primary | ICD-10-CM

## 2021-07-28 DIAGNOSIS — D50.9 IRON DEFICIENCY ANEMIA, UNSPECIFIED IRON DEFICIENCY ANEMIA TYPE: ICD-10-CM

## 2021-07-28 DIAGNOSIS — M32.9 LUPUS ARTHRITIS (HCC): ICD-10-CM

## 2021-07-28 LAB
ALBUMIN SERPL-MCNC: 3.5 G/DL (ref 3.5–5)
ALBUMIN/GLOB SERPL: 0.9 {RATIO} (ref 1.1–2.2)
ALP SERPL-CCNC: 71 U/L (ref 45–117)
ALT SERPL-CCNC: 15 U/L (ref 12–78)
ANION GAP SERPL CALC-SCNC: 8 MMOL/L (ref 5–15)
APPEARANCE UR: CLEAR
AST SERPL-CCNC: 13 U/L (ref 15–37)
BACTERIA URNS QL MICRO: NEGATIVE /HPF
BASOPHILS # BLD: 0 K/UL (ref 0–0.1)
BASOPHILS NFR BLD: 0 % (ref 0–1)
BILIRUB SERPL-MCNC: 0.2 MG/DL (ref 0.2–1)
BILIRUB UR QL: NEGATIVE
BNP SERPL-MCNC: 46 PG/ML
BUN SERPL-MCNC: 8 MG/DL (ref 6–20)
BUN/CREAT SERPL: 11 (ref 12–20)
CALCIUM SERPL-MCNC: 8.6 MG/DL (ref 8.5–10.1)
CHLORIDE SERPL-SCNC: 108 MMOL/L (ref 97–108)
CK SERPL-CCNC: 106 U/L (ref 26–192)
CO2 SERPL-SCNC: 22 MMOL/L (ref 21–32)
COLOR UR: ABNORMAL
COVID-19 RAPID TEST, COVR: DETECTED
CREAT SERPL-MCNC: 0.76 MG/DL (ref 0.55–1.02)
DIFFERENTIAL METHOD BLD: ABNORMAL
EOSINOPHIL # BLD: 0 K/UL (ref 0–0.4)
EOSINOPHIL NFR BLD: 0 % (ref 0–7)
EPITH CASTS URNS QL MICRO: ABNORMAL /LPF
ERYTHROCYTE [DISTWIDTH] IN BLOOD BY AUTOMATED COUNT: 15.9 % (ref 11.5–14.5)
GLOBULIN SER CALC-MCNC: 4.1 G/DL (ref 2–4)
GLUCOSE SERPL-MCNC: 92 MG/DL (ref 65–100)
GLUCOSE UR STRIP.AUTO-MCNC: NEGATIVE MG/DL
HCT VFR BLD AUTO: 31.5 % (ref 35–47)
HGB BLD-MCNC: 9.7 G/DL (ref 11.5–16)
HGB UR QL STRIP: NEGATIVE
HYALINE CASTS URNS QL MICRO: ABNORMAL /LPF (ref 0–5)
IMM GRANULOCYTES # BLD AUTO: 0 K/UL (ref 0–0.04)
IMM GRANULOCYTES NFR BLD AUTO: 0 % (ref 0–0.5)
KETONES UR QL STRIP.AUTO: NEGATIVE MG/DL
LEUKOCYTE ESTERASE UR QL STRIP.AUTO: ABNORMAL
LYMPHOCYTES # BLD: 1.1 K/UL (ref 0.8–3.5)
LYMPHOCYTES NFR BLD: 24 % (ref 12–49)
MCH RBC QN AUTO: 24.3 PG (ref 26–34)
MCHC RBC AUTO-ENTMCNC: 30.8 G/DL (ref 30–36.5)
MCV RBC AUTO: 78.8 FL (ref 80–99)
MONOCYTES # BLD: 0.5 K/UL (ref 0–1)
MONOCYTES NFR BLD: 10 % (ref 5–13)
NEUTS SEG # BLD: 3.1 K/UL (ref 1.8–8)
NEUTS SEG NFR BLD: 66 % (ref 32–75)
NITRITE UR QL STRIP.AUTO: NEGATIVE
NRBC # BLD: 0 K/UL (ref 0–0.01)
NRBC BLD-RTO: 0 PER 100 WBC
PH UR STRIP: 6 [PH] (ref 5–8)
PLATELET # BLD AUTO: 310 K/UL (ref 150–400)
PMV BLD AUTO: 10.9 FL (ref 8.9–12.9)
POTASSIUM SERPL-SCNC: 3.6 MMOL/L (ref 3.5–5.1)
PROT SERPL-MCNC: 7.6 G/DL (ref 6.4–8.2)
PROT UR STRIP-MCNC: NEGATIVE MG/DL
RBC # BLD AUTO: 4 M/UL (ref 3.8–5.2)
RBC #/AREA URNS HPF: ABNORMAL /HPF (ref 0–5)
SODIUM SERPL-SCNC: 138 MMOL/L (ref 136–145)
SOURCE, COVRS: ABNORMAL
SP GR UR REFRACTOMETRY: 1.02 (ref 1–1.03)
TROPONIN I SERPL-MCNC: <0.05 NG/ML
UA: UC IF INDICATED,UAUC: ABNORMAL
UROBILINOGEN UR QL STRIP.AUTO: 0.2 EU/DL (ref 0.2–1)
WBC # BLD AUTO: 4.7 K/UL (ref 3.6–11)
WBC URNS QL MICRO: ABNORMAL /HPF (ref 0–4)

## 2021-07-28 PROCEDURE — 85025 COMPLETE CBC W/AUTO DIFF WBC: CPT

## 2021-07-28 PROCEDURE — 93005 ELECTROCARDIOGRAM TRACING: CPT

## 2021-07-28 PROCEDURE — 82550 ASSAY OF CK (CPK): CPT

## 2021-07-28 PROCEDURE — 84484 ASSAY OF TROPONIN QUANT: CPT

## 2021-07-28 PROCEDURE — 99284 EMERGENCY DEPT VISIT MOD MDM: CPT

## 2021-07-28 PROCEDURE — 87635 SARS-COV-2 COVID-19 AMP PRB: CPT

## 2021-07-28 PROCEDURE — 71045 X-RAY EXAM CHEST 1 VIEW: CPT

## 2021-07-28 PROCEDURE — 36415 COLL VENOUS BLD VENIPUNCTURE: CPT

## 2021-07-28 PROCEDURE — 83880 ASSAY OF NATRIURETIC PEPTIDE: CPT

## 2021-07-28 PROCEDURE — 87086 URINE CULTURE/COLONY COUNT: CPT

## 2021-07-28 PROCEDURE — 81001 URINALYSIS AUTO W/SCOPE: CPT

## 2021-07-28 PROCEDURE — 80053 COMPREHEN METABOLIC PANEL: CPT

## 2021-07-28 RX ORDER — LANOLIN ALCOHOL/MO/W.PET/CERES
325 CREAM (GRAM) TOPICAL
Qty: 30 TABLET | Refills: 0 | Status: SHIPPED | OUTPATIENT
Start: 2021-07-28

## 2021-07-28 RX ORDER — UREA 10 %
1 LOTION (ML) TOPICAL DAILY
Qty: 5 TABLET | Refills: 0 | Status: SHIPPED | OUTPATIENT
Start: 2021-07-28 | End: 2021-08-02

## 2021-07-28 RX ORDER — CELECOXIB 200 MG/1
CAPSULE ORAL
Qty: 20 CAPSULE | Refills: 0 | Status: SHIPPED | OUTPATIENT
Start: 2021-07-28 | End: 2021-10-26

## 2021-07-28 RX ORDER — ASCORBIC ACID 500 MG
500 TABLET ORAL 2 TIMES DAILY
Qty: 10 TABLET | Refills: 0 | Status: SHIPPED | OUTPATIENT
Start: 2021-07-28 | End: 2021-08-02

## 2021-07-28 RX ORDER — PREDNISONE 50 MG/1
50 TABLET ORAL DAILY
Qty: 5 TABLET | Refills: 0 | Status: SHIPPED | OUTPATIENT
Start: 2021-07-28 | End: 2021-08-02

## 2021-07-28 NOTE — ED PROVIDER NOTES
EMERGENCY DEPARTMENT HISTORY AND PHYSICAL EXAM      Date: 7/28/2021  Patient Name: Sidra Carver    History of Presenting Illness     Chief Complaint   Patient presents with    Lupus     hx of lupus reports she feels like she is having a flare up, SOB and body aches. Fever of 101 last night. Friend recently in contact with someone who had COVID       History Provided By: Patient    HPI: Sidra Carver, 31 y.o. female with past medical history of lupus, iron deficiency anemia, presents to the ED with cc of concern for Covid infection as well as lupus flare. Patient reports that she moved to [place] of state several months ago. States that she has since run out of all of her home medications, and has not been taking any of her immunosuppressive medications for lupus for the past 3 months secondary to this. States that over the past week, she has been experiencing diffuse joint aching and mild swelling, which is consistent with her usual lupus arthritis flares. Patient normally takes Celebrex for her arthritis symptoms as well as prednisone when she is having a flare. In addition to this, patient is also concerned about the possibility of COVID-19 infection as she reports recently being exposed to a friend who had just been diagnosed with COVID-19. States that she has been having sore throat, intermittent coughing, as well as some shortness of breath. She states that she did have an episode of fever last night, which then self resolved. Denies any chest pain. Patient is not vaccinated for COVID-19. PCP: Esther Downing MD    No current facility-administered medications on file prior to encounter. Current Outpatient Medications on File Prior to Encounter   Medication Sig Dispense Refill    [DISCONTINUED] LORazepam (ATIVAN) 2 mg tablet Take 1 Tab by mouth every eight (8) hours as needed for Anxiety. Max Daily Amount: 6 mg. 35 Tab 0    CELECOXIB (CELEBREX PO) Take  by mouth.  (Patient not taking: Reported on 7/28/2021)      ESOMEPRAZOLE MAGNESIUM (NEXIUM PO) Take  by mouth. (Patient not taking: Reported on 7/28/2021)      [DISCONTINUED] SERTRALINE HCL (ZOLOFT PO) Take  by mouth.  [DISCONTINUED] ibuprofen (MOTRIN) 600 mg tablet Take 1 Tab by mouth every six (6) hours as needed for Pain. 20 Tab 0    [DISCONTINUED] traMADol (ULTRAM) 50 mg tablet Take 1 Tab by mouth every six (6) hours as needed for Pain. Max Daily Amount: 200 mg. 20 Tab 0    [DISCONTINUED] hydrocodone-acetaminophen (LORTAB) 5-500 mg per tablet Take  by mouth.  [DISCONTINUED] butalbital-acetaminophen-caffeine (FIORICET) -40 mg per tablet Take 1-2 Tabs by mouth every four (4) hours as needed for Pain. 20 Tab 0       Past History     Past Medical History:  Past Medical History:   Diagnosis Date    Autoimmune disease ([facility] [address] Lupus     Psychiatric disorder     panic attacks       Past Surgical History:  Past Surgical History:   Procedure Laterality Date    ABDOMEN SURGERY PROC UNLISTED      hernia repair    BREAST SURGERY PROCEDURE UNLISTED      cyst removal       Family History:  No family history on file. Social History:  Social History     Tobacco Use    Smoking status: Never Smoker   Substance Use Topics    Alcohol use: Yes    Drug use: No       Allergies: Allergies   Allergen Reactions    Penicillins Hives         Review of Systems   Review of Systems   Constitutional: Positive for fever. Negative for chills. HENT: Positive for sore throat. Negative for congestion and rhinorrhea. Eyes: Negative for visual disturbance. Respiratory: Positive for cough and shortness of breath. Negative for chest tightness. Gastrointestinal: Negative for abdominal pain, diarrhea, nausea and vomiting. Genitourinary: Negative for dysuria, flank pain and hematuria. Musculoskeletal: Positive for arthralgias and joint swelling. Negative for back pain and neck pain. Skin: Negative for rash.    Allergic/Immunologic: Negative for immunocompromised state. Neurological: Negative for dizziness, speech difficulty, weakness and headaches. Hematological: Negative for adenopathy. Psychiatric/Behavioral: Negative for dysphoric mood and suicidal ideas. Physical Exam   Physical Exam  Vitals and nursing note reviewed. Constitutional:       General: She is not in acute distress. Appearance: She is not ill-appearing or toxic-appearing. HENT:      Head: Normocephalic and atraumatic. Nose: Nose normal.      Mouth/Throat:      Mouth: Mucous membranes are moist.      Pharynx: Oropharynx is clear. Uvula midline. Tonsils: No tonsillar exudate or tonsillar abscesses. Eyes:      Extraocular Movements: Extraocular movements intact. Pupils: Pupils are equal, round, and reactive to light. Cardiovascular:      Rate and Rhythm: Normal rate and regular rhythm. Pulses: Normal pulses. Pulmonary:      Effort: Pulmonary effort is normal.      Breath sounds: Normal breath sounds and air entry. No stridor. No decreased breath sounds, wheezing or rhonchi. Abdominal:      General: Abdomen is flat. There is no distension. Tenderness: There is no abdominal tenderness. Musculoskeletal:         General: Normal range of motion. Cervical back: Normal range of motion and neck supple. Skin:     General: Skin is warm and dry. Neurological:      General: No focal deficit present. Mental Status: She is alert and oriented to person, place, and time.    Psychiatric:         Judgment: Judgment normal.         Diagnostic Study Results     Labs -     Recent Results (from the past 78 hour(s))   EKG, 12 LEAD, INITIAL    Collection Time: 07/28/21  2:54 PM   Result Value Ref Range    Ventricular Rate 78 BPM    Atrial Rate 78 BPM    P-R Interval 126 ms    QRS Duration 86 ms    Q-T Interval 358 ms    QTC Calculation (Bezet) 408 ms    Calculated P Axis 43 degrees    Calculated R Axis 11 degrees    Calculated T Axis 22 degrees    Diagnosis       Normal sinus rhythm  When compared with ECG of 15-ALONSO-2019 22:15,  T wave inversion less evident in Inferior leads  Confirmed by Pedrito Beaulieu MD, Jh Ramirez ([id number]) on 7/29/2021 8:43:18 AM     CBC WITH AUTOMATED DIFF    Collection Time: 07/28/21  3:51 PM   Result Value Ref Range    WBC 4.7 3.6 - 11.0 K/uL    RBC 4.00 3.80 - 5.20 M/uL    HGB 9.7 (L) 11.5 - 16.0 g/dL    HCT 31.5 (L) 35.0 - 47.0 %    MCV 78.8 (L) 80.0 - 99.0 FL    MCH 24.3 (L) 26.0 - 34.0 PG    MCHC 30.8 30.0 - 36.5 g/dL    RDW 15.9 (H) 11.5 - 14.5 %    PLATELET 499 402 - 498 K/uL    MPV 10.9 8.9 - 12.9 FL    NRBC 0.0 0  WBC    ABSOLUTE NRBC 0.00 0.00 - 0.01 K/uL    NEUTROPHILS 66 32 - 75 %    LYMPHOCYTES 24 12 - 49 %    MONOCYTES 10 5 - 13 %    EOSINOPHILS 0 0 - 7 %    BASOPHILS 0 0 - 1 %    IMMATURE GRANULOCYTES 0 0.0 - 0.5 %    ABS. NEUTROPHILS 3.1 1.8 - 8.0 K/UL    ABS. LYMPHOCYTES 1.1 0.8 - 3.5 K/UL    ABS. MONOCYTES 0.5 0.0 - 1.0 K/UL    ABS. EOSINOPHILS 0.0 0.0 - 0.4 K/UL    ABS. BASOPHILS 0.0 0.0 - 0.1 K/UL    ABS. IMM. GRANS. 0.0 0.00 - 0.04 K/UL    DF AUTOMATED     METABOLIC PANEL, COMPREHENSIVE    Collection Time: 07/28/21  3:51 PM   Result Value Ref Range    Sodium 138 136 - 145 mmol/L    Potassium 3.6 3.5 - 5.1 mmol/L    Chloride 108 97 - 108 mmol/L    CO2 22 21 - 32 mmol/L    Anion gap 8 5 - 15 mmol/L    Glucose 92 65 - 100 mg/dL    BUN 8 6 - 20 MG/DL    Creatinine 0.76 0.55 - 1.02 MG/DL    BUN/Creatinine ratio 11 (L) 12 - 20      GFR est AA >60 >60 ml/min/1.73m2    GFR est non-AA >60 >60 ml/min/1.73m2    Calcium 8.6 8.5 - 10.1 MG/DL    Bilirubin, total 0.2 0.2 - 1.0 MG/DL    ALT (SGPT) 15 12 - 78 U/L    AST (SGOT) 13 (L) 15 - 37 U/L    Alk.  phosphatase 71 45 - 117 U/L    Protein, total 7.6 6.4 - 8.2 g/dL    Albumin 3.5 3.5 - 5.0 g/dL    Globulin 4.1 (H) 2.0 - 4.0 g/dL    A-G Ratio 0.9 (L) 1.1 - 2.2     CK W/ REFLX CKMB    Collection Time: 07/28/21  3:51 PM   Result Value Ref Range     26 - 192 U/L TROPONIN I    Collection Time: 07/28/21  3:51 PM   Result Value Ref Range    Troponin-I, Qt. <0.05 <0.05 ng/mL   NT-PRO BNP    Collection Time: 07/28/21  3:51 PM   Result Value Ref Range    NT pro-BNP 46 <125 PG/ML   URINALYSIS W/ REFLEX CULTURE    Collection Time: 07/28/21  3:51 PM    Specimen: Urine   Result Value Ref Range    Color YELLOW/STRAW      Appearance CLEAR CLEAR      Specific gravity 1.020 1.003 - 1.030      pH (UA) 6.0 5.0 - 8.0      Protein Negative NEG mg/dL    Glucose Negative NEG mg/dL    Ketone Negative NEG mg/dL    Bilirubin Negative NEG      Blood Negative NEG      Urobilinogen 0.2 0.2 - 1.0 EU/dL    Nitrites Negative NEG      Leukocyte Esterase MODERATE (A) NEG      WBC 10-20 0 - 4 /hpf    RBC 0-5 0 - 5 /hpf    Epithelial cells FEW FEW /lpf    Bacteria Negative NEG /hpf    UA:UC IF INDICATED URINE CULTURE ORDERED (A) CNI      Hyaline cast 2-5 0 - 5 /lpf   CULTURE, URINE    Collection Time: 07/28/21  3:51 PM    Specimen: Urine   Result Value Ref Range    Special Requests: NO SPECIAL REQUESTS  Reflexed from [id number]        [place] Count <10,000  COLONIES/mL        Culture result: MIXED UROGENITAL EDWIN ISOLATED     COVID-19 RAPID TEST    Collection Time: 07/28/21  6:06 PM   Result Value Ref Range    Specimen source Nasopharyngeal      COVID-19 rapid test Detected (AA) NOTD         Radiologic Studies -   XR CHEST PORT   Final Result   Normal chest.        CT Results  (Last 48 hours)    None        CXR Results  (Last 48 hours)               07/28/21 1547  XR CHEST PORT Final result    Impression:  Normal chest.       Narrative:  EXAM: XR CHEST PORT       INDICATION: Shortness of breath       COMPARISON: 4/20/2011       FINDINGS: A portable AP radiograph of the chest was obtained at 1527 hours. The   lungs are clear. The cardiac and mediastinal contours and pulmonary vascularity   are normal.  The bones and soft tissues are grossly within normal limits.                   Medical Decision Making   I am the first provider for this patient. I reviewed the vital signs, available nursing notes, past medical history, past surgical history, family history and social history. Vital Signs-Reviewed the patient's vital signs. Patient Vitals for the past 24 hrs:   Temp Pulse Resp BP SpO2   07/28/21 1748  87 16 124/88 100 %   07/28/21 1727 98.9 °F (37.2 °C) 89 14 135/82 100 %   07/28/21 1447 99.5 °F (37.5 °C) 91 14 131/80 100 %       EKG interpretation: (Preliminary)  Normal sinus rhythm, 78 bpm, no acute ST changes concerning for ischemia. QTC normal. EKG interpretation by Linwood Poe MD    Records Reviewed: Nursing Notes and Old Medical Records    Provider Notes (Medical Decision Making):   Vitals are stable. Patient is afebrile, nontoxic in the ED. She is not in any acute distress. No cough noted during my evaluation. Breathing comfortably on room air, speaking full sentences, without increased WOB. SPO2 of 100% on room air. Work-up in the ED significant for microcytic anemia with hemoglobin of 9.7, MCV of 78.8. Patient reports that she previously took iron for history of persistent iron deficiency anemia. However, since moving to [facility], she has not had follow-up with her PCP, and would like a refill for iron today. Her COVID-19 rapid swab is positive. Chest x-ray is negative for acute findings. Results of her work-up discussed with the patient. Advised for her to obtain a pulse oximeter, and closely monitor her oxygen levels at home. Patient is advised to return to the ED if SPO2 consistently drops below 90%. At this time, she is not in any distress, satting well room air, and is therefore stable for discharge to home. I will provide her Rx for ascorbic acid, zinc sulfate, ferrous sulfate refill per her request, Celebrex refill for her lupus arthritis, as well as 5 days of prednisone burst to help with possible lupus flare as well as inadvertently perhaps Covid infection as well.   Patient is given PCP referral list as she is new to the area, and is advised to set up an early outpatient follow-up appointment. Oneil Barrera MD      Disposition:  Discharge      DISCHARGE PLAN:  1. Discharge Medication List as of 7/28/2021  6:47 PM      START taking these medications    Details   celecoxib (CELEBREX) 200 mg capsule Take 1 Capsule by mouth two (2) times a day for 3 days, THEN 1 Capsule two (2) times daily as needed for Pain for up to 7 days. , Normal, Disp-20 Capsule, R-0Pharmacist, Please add the following text to the patient instructions, \"Take as needed for pain  in addition to any other medications ordered for pain relief. \"      predniSONE (DELTASONE) 50 mg tablet Take 1 Tablet by mouth daily for 5 days. , Normal, Disp-5 Tablet, R-0      ferrous sulfate (Iron, Ferrous Sulfate,) 325 mg (65 mg iron) tablet Take 1 Tablet by mouth Daily (before breakfast). , Normal, Disp-30 Tablet, R-0      ascorbic acid, vitamin C, (VITAMIN C) 500 mg tablet Take 1 Tablet by mouth two (2) times a day for 5 days. , Normal, Disp-10 Tablet, R-0      zinc sulfate 50 mg zinc (220 mg) tablet Take 1 Tablet by mouth daily for 5 days. , Normal, Disp-5 Tablet, R-0         CONTINUE these medications which have NOT CHANGED    Details   ESOMEPRAZOLE MAGNESIUM (NEXIUM PO) Take  by mouth., Historical Med           2. Follow-up Information     Follow up With Specialties Details Why Contact Info    Khang Wynne MD Rheumatology   [address] [place] [address]  [phone]    [facility] [address] [address]  [phone]        3. Return to ED if worse     Diagnosis     Clinical Impression:   1. COVID-19 virus infection    2. Lupus arthritis ([address]    3.  Iron deficiency anemia, unspecified iron deficiency anemia type        Attestations:    Oneil Barrera MD    Please note that this dictation was completed with Dragon, the computer voice recognition software. Quite often unanticipated grammatical, syntax, homophones, and other interpretive errors are inadvertently transcribed by the computer software. Please disregard these errors. Please excuse any errors that have escaped final proofreading. Thank you.

## 2021-07-28 NOTE — DISCHARGE INSTRUCTIONS
It was a pleasure taking care of you at [facility] Emergency Department today. We know that when you come to [address], you are entrusting us with your health, comfort, and safety. Our physicians and nurses honor that trust, and we truly appreciate the opportunity to care for you and your loved ones. We also value your feedback. If you receive a survey about your Emergency Department experience today, please fill it out. We care about our patients' feedback, and we listen to what you have to say. Thank you! Chelsey Peterson MD    -----------------------------------------------------------------------------------------    [facility] SYSTEMS Departments     For adult and child immunizations, family planning, TB screening, STD testing and women's health services. [facility]: [place] [phone]      [facility] 25   [address]   [address]   170 [facility]: Eliezer Schilder 200 [facility] [phone]      [address] [place] [address]          [address] [address]     For primary care services, woman and child wellness, and some clinics providing specialty care. U -- 1011 [facility]. [address] [place] [address] [phone]   [address] [place] [address] [facility] 200 [facility] 3614 [facility] [phone]   [address] [place] [address] [address] [place] [address] [phone]   [address] [facility] GI Track [address] [place] [address] [address] [place] [address]  [phone]   [address] [place] [address]  [address] [place] [phone]   [facility] 81 [facility] [phone]   [facility] [address] [place], [place] [address]Emi [phone]   [facility]: [facility] 165 [facility] [phone], ext Patricia [address] [place] [address], [address] [phone]     [address] [phone]   [facility] 5850 [facility]  [phone]   [employer]  [address] ([email or web address]) [phone]         Sexual Health/Woman Wellness Clinics    For STD/HIV testing and treatment, pregnancy testing and services, men's health, birth control services, LGBT services, and hepatitis/HPV vaccine services. [employer] for [place] [employer] [address] [facility] [address] [facility] [address] [facility] 1579 [address] [phone]   [facility] [address], 5th floor [phone]   Pregnancy [address] [facility] Women [address]  [place] [phone] [address] [facility] [address] [place] [address]   [phone]   [place]   [phone]   Women, Infant and Children's Services: [address] [phone]       600 [facility]   [phone]   [address]   [facility]     [phone]   [address] Crisis   1212 [facility] [phone]       Local Primary Care Physicians  64 [facility] [phone]  MD Sherron Bach MD Cassell Denise, MD [facility] [phone]  Vania Garduno, [facility]  MD Yuri Foote MD Lorelee Cliff, MD [address] [place] [address] [phone]  [place] [address] [facility] [phone]  MD Nubia Hurd MD Claud Heir, MD Janit Ar, MD   [facility] [phone]  [facility] ASHA CEJA, MD Jackie Bailey, NP [address] [facility] [address] [phone]  MD Sharon Esteban MD Levis Rathke, MD Claretha Farmer, MD Joni Isaac, MD Elidia Anis, MD Deon Chancy, MD   3461 [facility] 1280 Tee Garcia, MD [address] [facility] [phone]  Ralph Floyd, MD Ashlie Sampson, NP  Peter Esquivel, MD Phylicia Vazquez, MD Sabine Portillo, MD Tamara Nageotte, MD Wing Lyle, MD   7942 [facility] [address] [facility] [phone]  Marquita Rand, MD Betsey Olivas, FNP  Torin Collado, NP  Hudson Valverde, MD Kristen Drew, MD Audrey Dutton, MD Ramos Erwin, MD ANDERSON [facility] [phone]  Muriel Talamantes, MD Luigi Curiel, MD Goldie Decker, MD Cecilio Poole, MD Lashell Sow, MD   [facility] [phone]  Ezekiel Avila, MD Marla Price, MD [facility] [phone]  Asiya Gabriel, MD Elizabeth Euceda, MD Kimberly Myers, MD   [facility] [phone]  [facility], MD Katharine Bunch, MD Jennifer Rivero, MD Suellen Khan, MD Fernando Archibald, MD Erlinda Arthur, NP  Carrie Pierce MD 1619 [facility]   [phone]  MD Angie Serna, MD Nik Snyder, MD   2102 [facility] [phone]  [place] [address], MD Jacek Awan, FNP  Venkatesh Resendiz, MATT Resendiz, FNP  Kostas Gonzalez, MATT Quiroz, MD Tamika Jameson, NP   Quincy Mendez,  Miscellaneous:  Maia Yu [phone]

## 2021-07-28 NOTE — Clinical Note
[address]  [facility] EMERGENCY DEPT  94 [facility]  [address] [place] [phone]    Work/School Note    Date: 7/28/2021     To Whom It May concern:    Maylin Workman was evaulated by the following provider(s):  Attending Provider: Rashad Sinha MD.   Nohemi Plants virus is suspected. Per the CDC guidelines we recommend home isolation until the following conditions are all met:    1. At least 10 days have passed since symptoms first appeared and  2. At least 24 hours have passed since last fever without the use of fever-reducing medications and  3.  Symptoms (e.g., cough, shortness of breath) have improved    Sincerely,          Tala Pang MD

## 2021-07-29 ENCOUNTER — PATIENT OUTREACH (OUTPATIENT)
Dept: CASE MANAGEMENT | Age: 31
End: 2021-07-29

## 2021-07-29 LAB
ATRIAL RATE: 78 BPM
BACTERIA SPEC CULT: NORMAL
CALCULATED P AXIS, ECG09: 43 DEGREES
CALCULATED R AXIS, ECG10: 11 DEGREES
CALCULATED T AXIS, ECG11: 22 DEGREES
CC UR VC: NORMAL
DIAGNOSIS, 93000: NORMAL
P-R INTERVAL, ECG05: 126 MS
Q-T INTERVAL, ECG07: 358 MS
QRS DURATION, ECG06: 86 MS
QTC CALCULATION (BEZET), ECG08: 408 MS
SERVICE CMNT-IMP: NORMAL
VENTRICULAR RATE, ECG03: 78 BPM

## 2021-07-29 NOTE — PROGRESS NOTES
Patient contacted regarding COVID-19 diagnosis. Discussed COVID-19 related testing which was available at this time. Test results were positive. Patient informed of results, if available? yes. Ambulatory Care Manager contacted the patient by telephone to perform post discharge assessment. Call within 2 business days of discharge: Yes Verified name and  with patient as identifiers. Provided introduction to self, and explanation of the CTN/ACM role, and reason for call due to risk factors for infection and/or exposure to COVID-19. Symptoms reviewed with patient who verbalized the following symptoms: pain or aching joints, no new symptoms and no worsening symptoms      Due to no new or worsening symptoms encounter was not routed to provider for escalation. Discussed follow-up appointments. If no appointment was previously scheduled, appointment scheduling offered:  yes. [facility] follow up appointment(s): No future appointments. Non[facility] follow up appointment(s): None at this time. Interventions to address risk factors: Obtained and reviewed discharge summary and/or continuity of care documents     Advance Care Planning:   Does patient have an Advance Directive: not on file. Educated patient about risk for severe COVID-19 due to risk factors according to CDC guidelines. ACM reviewed discharge instructions, medical action plan and red flag symptoms with the patient who verbalized understanding. Discussed COVID vaccination status: yes. Education provided on COVID-19 vaccination as appropriate. Discussed exposure protocols and quarantine with CDC Guidelines. Patient was given an opportunity to verbalize any questions and concerns and agrees to contact ACM or health care provider for questions related to their healthcare. Reviewed and educated patient on any new and changed medications related to discharge diagnosis     Was patient discharged with a pulse oximeter?  no Discussed and confirmed pulse oximeter discharge instructions and when to notify provider or seek emergency care. ACM provided contact information. Plan for follow-up call in 3-5 days based on severity of symptoms and risk factors.

## 2021-08-04 ENCOUNTER — PATIENT OUTREACH (OUTPATIENT)
Dept: CASE MANAGEMENT | Age: 31
End: 2021-08-04

## 2021-08-04 NOTE — PROGRESS NOTES
Educated patient about risk for severe COVID-19 due to risk factors according to CDC guidelines. ACM reviewed discharge instructions, medical action plan and red flag symptoms with the patient who verbalized understanding. Discussed COVID vaccination status: yes. Education provided on COVID-19 vaccination as appropriate. Discussed exposure protocols and quarantine with CDC Guidelines. Patient was given an opportunity to verbalize any questions and concerns and agrees to contact ACM or health care provider for questions related to their healthcare. Patient has developed a cough and has some SOB with walking. Patient is afebrile. She has experienced vomiting and diarrhea in the last three days. She is now able to keep fluids down without nausea. Encouraged to continue to increase fluid intake. Coached on slow deep breathing. Patient understands to return to ED if symptoms worsen or SOB increases or chest pain develops.

## 2022-04-29 ENCOUNTER — TELEPHONE (OUTPATIENT)
Dept: RHEUMATOLOGY | Age: 32
End: 2022-04-29

## 2022-04-29 ENCOUNTER — TRANSCRIBE ORDER (OUTPATIENT)
Dept: REGISTRATION | Age: 32
End: 2022-04-29

## 2022-04-29 ENCOUNTER — HOSPITAL ENCOUNTER (OUTPATIENT)
Dept: GENERAL RADIOLOGY | Age: 32
Discharge: HOME | End: 2022-04-29
Payer: MEDICAID

## 2022-04-29 DIAGNOSIS — R07.9 CHEST PAIN: ICD-10-CM

## 2022-04-29 DIAGNOSIS — R06.02 SOB (SHORTNESS OF BREATH): Primary | ICD-10-CM

## 2022-04-29 DIAGNOSIS — R06.02 SOB (SHORTNESS OF BREATH): ICD-10-CM

## 2022-04-29 PROCEDURE — 71046 X-RAY EXAM CHEST 2 VIEWS: CPT

## 2022-04-29 NOTE — TELEPHONE ENCOUNTER
Called & lft  for pt to schedule NP appt. Advised in message no appointments until March 2023.     Referral scanned into media

## 2024-10-22 ENCOUNTER — OFFICE VISIT (OUTPATIENT)
Age: 34
End: 2024-10-22
Payer: COMMERCIAL

## 2024-10-22 VITALS
HEIGHT: 67 IN | RESPIRATION RATE: 18 BRPM | BODY MASS INDEX: 39.31 KG/M2 | WEIGHT: 250.5 LBS | SYSTOLIC BLOOD PRESSURE: 118 MMHG | DIASTOLIC BLOOD PRESSURE: 82 MMHG

## 2024-10-22 DIAGNOSIS — Z00.00 ENCOUNTER FOR MEDICAL EXAMINATION TO ESTABLISH CARE: ICD-10-CM

## 2024-10-22 DIAGNOSIS — Z13.220 LIPID SCREENING: ICD-10-CM

## 2024-10-22 DIAGNOSIS — F32.A ANXIETY AND DEPRESSION: ICD-10-CM

## 2024-10-22 DIAGNOSIS — M45.9 RHEUMATOID ARTHRITIS INVOLVING VERTEBRA WITH POSITIVE RHEUMATOID FACTOR (HCC): ICD-10-CM

## 2024-10-22 DIAGNOSIS — F41.9 ANXIETY AND DEPRESSION: ICD-10-CM

## 2024-10-22 DIAGNOSIS — D50.9 IRON DEFICIENCY ANEMIA, UNSPECIFIED IRON DEFICIENCY ANEMIA TYPE: ICD-10-CM

## 2024-10-22 DIAGNOSIS — E55.9 VITAMIN D DEFICIENCY: ICD-10-CM

## 2024-10-22 DIAGNOSIS — R10.2 PELVIC PAIN: ICD-10-CM

## 2024-10-22 DIAGNOSIS — M32.9 SYSTEMIC LUPUS ERYTHEMATOSUS, UNSPECIFIED SLE TYPE, UNSPECIFIED ORGAN INVOLVEMENT STATUS (HCC): ICD-10-CM

## 2024-10-22 DIAGNOSIS — Z00.00 ENCOUNTER FOR MEDICAL EXAMINATION TO ESTABLISH CARE: Primary | ICD-10-CM

## 2024-10-22 DIAGNOSIS — F31.9 BIPOLAR 1 DISORDER (HCC): ICD-10-CM

## 2024-10-22 PROBLEM — M06.9 RHEUMATOID ARTHRITIS (HCC): Status: ACTIVE | Noted: 2024-10-22

## 2024-10-22 PROCEDURE — 99204 OFFICE O/P NEW MOD 45 MIN: CPT | Performed by: INTERNAL MEDICINE

## 2024-10-22 RX ORDER — HYDROXYCHLOROQUINE SULFATE 200 MG/1
200 TABLET, FILM COATED ORAL DAILY
COMMUNITY

## 2024-10-22 RX ORDER — MONTELUKAST SODIUM 10 MG/1
10 TABLET ORAL NIGHTLY
COMMUNITY

## 2024-10-22 RX ORDER — KETOROLAC TROMETHAMINE 10 MG/1
10 TABLET, FILM COATED ORAL EVERY 6 HOURS PRN
COMMUNITY
Start: 2024-07-30

## 2024-10-22 RX ORDER — NITROFURANTOIN 25; 75 MG/1; MG/1
100 CAPSULE ORAL 2 TIMES DAILY
COMMUNITY
Start: 2024-07-30 | End: 2024-10-22

## 2024-10-22 RX ORDER — ARIPIPRAZOLE 5 MG/1
5 TABLET ORAL DAILY
Qty: 30 TABLET | Refills: 3 | Status: SHIPPED | OUTPATIENT
Start: 2024-10-22

## 2024-10-22 RX ORDER — FEXOFENADINE HCL 180 MG/1
180 TABLET ORAL DAILY
COMMUNITY

## 2024-10-22 RX ORDER — DOXYCYCLINE 100 MG/1
100 CAPSULE ORAL 2 TIMES DAILY
COMMUNITY

## 2024-10-22 SDOH — ECONOMIC STABILITY: FOOD INSECURITY: WITHIN THE PAST 12 MONTHS, THE FOOD YOU BOUGHT JUST DIDN'T LAST AND YOU DIDN'T HAVE MONEY TO GET MORE.: NEVER TRUE

## 2024-10-22 SDOH — ECONOMIC STABILITY: INCOME INSECURITY: HOW HARD IS IT FOR YOU TO PAY FOR THE VERY BASICS LIKE FOOD, HOUSING, MEDICAL CARE, AND HEATING?: NOT HARD AT ALL

## 2024-10-22 SDOH — ECONOMIC STABILITY: FOOD INSECURITY: WITHIN THE PAST 12 MONTHS, YOU WORRIED THAT YOUR FOOD WOULD RUN OUT BEFORE YOU GOT MONEY TO BUY MORE.: NEVER TRUE

## 2024-10-22 ASSESSMENT — PATIENT HEALTH QUESTIONNAIRE - PHQ9
SUM OF ALL RESPONSES TO PHQ QUESTIONS 1-9: 11
9. THOUGHTS THAT YOU WOULD BE BETTER OFF DEAD, OR OF HURTING YOURSELF: NOT AT ALL
8. MOVING OR SPEAKING SO SLOWLY THAT OTHER PEOPLE COULD HAVE NOTICED. OR THE OPPOSITE, BEING SO FIGETY OR RESTLESS THAT YOU HAVE BEEN MOVING AROUND A LOT MORE THAN USUAL: NOT AT ALL
3. TROUBLE FALLING OR STAYING ASLEEP: MORE THAN HALF THE DAYS
SUM OF ALL RESPONSES TO PHQ9 QUESTIONS 1 & 2: 3
SUM OF ALL RESPONSES TO PHQ QUESTIONS 1-9: 11
4. FEELING TIRED OR HAVING LITTLE ENERGY: MORE THAN HALF THE DAYS
5. POOR APPETITE OR OVEREATING: MORE THAN HALF THE DAYS
2. FEELING DOWN, DEPRESSED OR HOPELESS: SEVERAL DAYS
1. LITTLE INTEREST OR PLEASURE IN DOING THINGS: MORE THAN HALF THE DAYS
6. FEELING BAD ABOUT YOURSELF - OR THAT YOU ARE A FAILURE OR HAVE LET YOURSELF OR YOUR FAMILY DOWN: SEVERAL DAYS
10. IF YOU CHECKED OFF ANY PROBLEMS, HOW DIFFICULT HAVE THESE PROBLEMS MADE IT FOR YOU TO DO YOUR WORK, TAKE CARE OF THINGS AT HOME, OR GET ALONG WITH OTHER PEOPLE: NOT DIFFICULT AT ALL
7. TROUBLE CONCENTRATING ON THINGS, SUCH AS READING THE NEWSPAPER OR WATCHING TELEVISION: SEVERAL DAYS

## 2024-10-22 ASSESSMENT — ENCOUNTER SYMPTOMS
RESPIRATORY NEGATIVE: 1
GASTROINTESTINAL NEGATIVE: 1

## 2024-10-22 NOTE — PROGRESS NOTES
Chief Complaint   Patient presents with    Establish Care     \"Have you been to the ER, urgent care clinic since your last visit?  Hospitalized since your last visit?\"    Overian cyst/fibroid  JW   07/30/2024      “Have you seen or consulted any other health care providers outside our system since your last visit?”    New patient     “Have you had a pap smear?”    NO    No cervical cancer screening on file

## 2024-10-22 NOTE — PROGRESS NOTES
Subjective    Izabela Ohara is a 34 y.o. female who presents today for the following:  Chief Complaint   Patient presents with    Establish Care       History of Present Illness  The patient presents for evaluation of multiple medical concerns and is due for her annual check-up.    She has not consulted a primary care provider in 4 years, instead seeking care from specialists.     She has been diagnosed with lupus and rheumatoid arthritis, which cause constant fatigue and swelling in her hands, legs, and feet, limiting her mobility. She experiences joint pain, particularly upon waking, and her hands are often sore.     She suffers from anemia and takes iron supplements.    She has a history of ovarian cysts and fibroids, which cause severe pelvic pain. She has experienced irregular menstrual cycles and heavy bleeding.    She has a history of anxiety, depression, and bipolar disorder, diagnosed at age 10. She has had episodes of blackouts and emotional outbursts since childhood. She has not seen a mental health professional since 2017, preferring to manage her symptoms independently. She has no recent suicidal thoughts.    She has a history of benign breast cysts and requires regular check-ups due to a family history of breast cancer.    She is sensitive to chlorine and prone to bacterial infections.    She was born with a heart murmur and has had COVID-19 three times, which has led to breathing problems and pneumonia. She uses a nebulizer and inhaler as needed.    She wears glasses and has been diagnosed with dry eye syndrome.    SOCIAL HISTORY  She works as a .    FAMILY HISTORY  Her mother has lupus, rheumatoid arthritis, anxiety, depression, and bipolar schizophrenia. Her maternal grandmother passed away from stomach cancer. Breast cancer runs in her maternal side of the family. Her father is healthy. Her brother has kidney failure and high blood pressure.    ALLERGIES  She is allergic to

## 2024-10-25 LAB
BASOPHILS # BLD AUTO: 0 X10E3/UL (ref 0–0.2)
BASOPHILS NFR BLD AUTO: 0 %
EOSINOPHIL # BLD AUTO: 0.1 X10E3/UL (ref 0–0.4)
EOSINOPHIL NFR BLD AUTO: 1 %
ERYTHROCYTE [DISTWIDTH] IN BLOOD BY AUTOMATED COUNT: 15.8 % (ref 11.7–15.4)
HCT VFR BLD AUTO: 31.4 % (ref 34–46.6)
HGB BLD-MCNC: 8.7 G/DL (ref 11.1–15.9)
IMM GRANULOCYTES # BLD AUTO: 0 X10E3/UL (ref 0–0.1)
IMM GRANULOCYTES NFR BLD AUTO: 0 %
LYMPHOCYTES # BLD AUTO: 2.3 X10E3/UL (ref 0.7–3.1)
LYMPHOCYTES NFR BLD AUTO: 41 %
MCH RBC QN AUTO: 20.6 PG (ref 26.6–33)
MCHC RBC AUTO-ENTMCNC: 27.7 G/DL (ref 31.5–35.7)
MCV RBC AUTO: 74 FL (ref 79–97)
MONOCYTES # BLD AUTO: 0.4 X10E3/UL (ref 0.1–0.9)
MONOCYTES NFR BLD AUTO: 7 %
NEUTROPHILS # BLD AUTO: 2.8 X10E3/UL (ref 1.4–7)
NEUTROPHILS NFR BLD AUTO: 51 %
PLATELET # BLD AUTO: 442 X10E3/UL (ref 150–450)
RBC # BLD AUTO: 4.23 X10E6/UL (ref 3.77–5.28)
WBC # BLD AUTO: 5.6 X10E3/UL (ref 3.4–10.8)

## 2024-10-26 LAB
25(OH)D3+25(OH)D2 SERPL-MCNC: 23.4 NG/ML (ref 30–100)
ALBUMIN SERPL-MCNC: 4.2 G/DL (ref 3.9–4.9)
ALP SERPL-CCNC: 70 IU/L (ref 44–121)
ALT SERPL-CCNC: 10 IU/L (ref 0–32)
AST SERPL-CCNC: 12 IU/L (ref 0–40)
BILIRUB SERPL-MCNC: <0.2 MG/DL (ref 0–1.2)
BUN SERPL-MCNC: 11 MG/DL (ref 6–20)
BUN/CREAT SERPL: 15 (ref 9–23)
CALCIUM SERPL-MCNC: 9.3 MG/DL (ref 8.7–10.2)
CHLORIDE SERPL-SCNC: 104 MMOL/L (ref 96–106)
CHOLEST SERPL-MCNC: 179 MG/DL (ref 100–199)
CO2 SERPL-SCNC: 21 MMOL/L (ref 20–29)
CREAT SERPL-MCNC: 0.72 MG/DL (ref 0.57–1)
EGFRCR SERPLBLD CKD-EPI 2021: 112 ML/MIN/1.73
GLOBULIN SER CALC-MCNC: 2.8 G/DL (ref 1.5–4.5)
GLUCOSE SERPL-MCNC: 90 MG/DL (ref 70–99)
HDLC SERPL-MCNC: 63 MG/DL
IMP & REVIEW OF LAB RESULTS: NORMAL
LDLC SERPL CALC-MCNC: 107 MG/DL (ref 0–99)
POTASSIUM SERPL-SCNC: 4.4 MMOL/L (ref 3.5–5.2)
PROT SERPL-MCNC: 7 G/DL (ref 6–8.5)
SODIUM SERPL-SCNC: 136 MMOL/L (ref 134–144)
TRIGL SERPL-MCNC: 44 MG/DL (ref 0–149)
TSH SERPL DL<=0.005 MIU/L-ACNC: 1.3 UIU/ML (ref 0.45–4.5)
VLDLC SERPL CALC-MCNC: 9 MG/DL (ref 5–40)

## 2024-11-13 ENCOUNTER — PATIENT MESSAGE (OUTPATIENT)
Age: 34
End: 2024-11-13

## 2024-11-14 DIAGNOSIS — F31.9 BIPOLAR 1 DISORDER (HCC): ICD-10-CM

## 2024-11-14 DIAGNOSIS — F41.9 ANXIETY AND DEPRESSION: ICD-10-CM

## 2024-11-14 DIAGNOSIS — F32.A ANXIETY AND DEPRESSION: ICD-10-CM

## 2024-11-19 RX ORDER — ARIPIPRAZOLE 5 MG/1
5 TABLET ORAL DAILY
Qty: 90 TABLET | Refills: 0 | Status: SHIPPED | OUTPATIENT
Start: 2024-11-19 | End: 2024-11-22 | Stop reason: SDUPTHER

## 2024-11-22 DIAGNOSIS — F41.9 ANXIETY AND DEPRESSION: ICD-10-CM

## 2024-11-22 DIAGNOSIS — F31.9 BIPOLAR 1 DISORDER (HCC): ICD-10-CM

## 2024-11-22 DIAGNOSIS — F32.A ANXIETY AND DEPRESSION: ICD-10-CM

## 2024-11-22 RX ORDER — ARIPIPRAZOLE 5 MG/1
5 TABLET ORAL DAILY
Qty: 90 TABLET | Refills: 0 | Status: SHIPPED | OUTPATIENT
Start: 2024-11-22

## 2024-12-03 ENCOUNTER — OFFICE VISIT (OUTPATIENT)
Age: 34
End: 2024-12-03

## 2024-12-03 VITALS
BODY MASS INDEX: 39.24 KG/M2 | HEIGHT: 67 IN | DIASTOLIC BLOOD PRESSURE: 76 MMHG | WEIGHT: 250 LBS | OXYGEN SATURATION: 98 % | RESPIRATION RATE: 19 BRPM | HEART RATE: 77 BPM | SYSTOLIC BLOOD PRESSURE: 120 MMHG

## 2024-12-03 DIAGNOSIS — F31.9 BIPOLAR 1 DISORDER (HCC): ICD-10-CM

## 2024-12-03 DIAGNOSIS — F41.9 ANXIETY AND DEPRESSION: Primary | ICD-10-CM

## 2024-12-03 DIAGNOSIS — F32.A ANXIETY AND DEPRESSION: Primary | ICD-10-CM

## 2024-12-03 DIAGNOSIS — E66.9 OBESITY (BMI 30-39.9): ICD-10-CM

## 2024-12-03 RX ORDER — AZATHIOPRINE 50 MG/1
100 TABLET ORAL DAILY
COMMUNITY
Start: 2024-11-25 | End: 2024-12-25

## 2024-12-03 RX ORDER — ARIPIPRAZOLE 5 MG/1
10 TABLET ORAL DAILY
Qty: 180 TABLET | Refills: 0 | Status: SHIPPED | OUTPATIENT
Start: 2024-12-03

## 2024-12-03 ASSESSMENT — ENCOUNTER SYMPTOMS
GASTROINTESTINAL NEGATIVE: 1
RESPIRATORY NEGATIVE: 1

## 2024-12-03 NOTE — PROGRESS NOTES
Subjective    Izabela Ohara is a 34 y.o. female who presents today for the following:  Chief Complaint   Patient presents with    Weight Management    Discuss Medications     Abilify       History of Present Illness  The patient presents for evaluation of multiple medical concerns.    She reports that her current medication, Abilify, is effective but feels the need to take it twice daily due to mood fluctuations in the latter half of the day. She expresses a desire to consult with a therapist as she continues to struggle. She has been engaging in physical exercise for the past 3 weeks.    She mentions difficulty in scheduling an appointment with her rheumatologist for her lupus management.    She is interested in exploring medication options to suppress her appetite and aid in weight loss, which she believes may alleviate her joint pain. She has not previously used any appetite-suppressing medications. She admits to frequent snacking and late-night eating, and has reduced her soda intake over the past 3 weeks. She typically consumes one meal per day.        PMH/PSH/Allergies/Social History/medication list and most recent studies reviewed with patient.     reports that she has never smoked. She has never been exposed to tobacco smoke. She has never used smokeless tobacco.    reports current alcohol use.   Results       Vitals:    12/03/24 0937   BP: 120/76   Pulse: 77   Resp: 19   SpO2: 98%     Body mass index is 39.16 kg/m².      12/3/2024     9:37 AM 10/22/2024     1:59 PM 7/28/2021     2:47 PM   Weight Metrics   Weight 250 lb 250 lb 8 oz 251 lb 12.3 oz   BMI (Calculated) 39.2 kg/m2 39.3 kg/m2 0 kg/m2       Past Medical History:   Diagnosis Date    Anxiety     Autoimmune disease (HCC) Lupus     Bipolar disorder (HCC)     Depression     Headache     Psychiatric disorder     panic attacks       Allergies   Allergen Reactions    Macrobid [Nitrofurantoin] Other (See Comments)     Vaginal irritation     Penicillins

## 2024-12-03 NOTE — PROGRESS NOTES
Chief Complaint   Patient presents with    Weight Management    Discuss Medications     Abilify       \"Have you been to the ER, urgent care clinic since your last visit?  Hospitalized since your last visit?\"    NO    “Have you seen or consulted any other health care providers outside our system since your last visit?”    NO     “Have you had a pap smear?”    scheduled    No cervical cancer screening on file

## 2024-12-04 DIAGNOSIS — E66.9 OBESITY (BMI 30-39.9): Primary | ICD-10-CM

## 2024-12-04 RX ORDER — PHENTERMINE HYDROCHLORIDE 8 MG/1
8 TABLET ORAL DAILY
Qty: 30 TABLET | Refills: 1 | Status: SHIPPED | OUTPATIENT
Start: 2024-12-04 | End: 2025-01-03

## 2024-12-30 DIAGNOSIS — E66.9 OBESITY (BMI 30-39.9): Primary | ICD-10-CM

## 2024-12-30 RX ORDER — PHENTERMINE HYDROCHLORIDE 15 MG/1
15 CAPSULE ORAL EVERY MORNING
Qty: 30 CAPSULE | Refills: 0 | Status: SHIPPED | OUTPATIENT
Start: 2024-12-30 | End: 2025-01-29

## 2025-01-11 SDOH — ECONOMIC STABILITY: INCOME INSECURITY: IN THE LAST 12 MONTHS, WAS THERE A TIME WHEN YOU WERE NOT ABLE TO PAY THE MORTGAGE OR RENT ON TIME?: YES

## 2025-01-11 SDOH — ECONOMIC STABILITY: FOOD INSECURITY: WITHIN THE PAST 12 MONTHS, THE FOOD YOU BOUGHT JUST DIDN'T LAST AND YOU DIDN'T HAVE MONEY TO GET MORE.: NEVER TRUE

## 2025-01-11 SDOH — ECONOMIC STABILITY: FOOD INSECURITY: WITHIN THE PAST 12 MONTHS, YOU WORRIED THAT YOUR FOOD WOULD RUN OUT BEFORE YOU GOT MONEY TO BUY MORE.: NEVER TRUE

## 2025-01-11 SDOH — ECONOMIC STABILITY: TRANSPORTATION INSECURITY
IN THE PAST 12 MONTHS, HAS THE LACK OF TRANSPORTATION KEPT YOU FROM MEDICAL APPOINTMENTS OR FROM GETTING MEDICATIONS?: NO

## 2025-01-11 SDOH — ECONOMIC STABILITY: TRANSPORTATION INSECURITY
IN THE PAST 12 MONTHS, HAS LACK OF TRANSPORTATION KEPT YOU FROM MEETINGS, WORK, OR FROM GETTING THINGS NEEDED FOR DAILY LIVING?: NO

## 2025-01-11 ASSESSMENT — PATIENT HEALTH QUESTIONNAIRE - PHQ9
1. LITTLE INTEREST OR PLEASURE IN DOING THINGS: SEVERAL DAYS
9. THOUGHTS THAT YOU WOULD BE BETTER OFF DEAD, OR OF HURTING YOURSELF: NOT AT ALL
2. FEELING DOWN, DEPRESSED OR HOPELESS: SEVERAL DAYS
3. TROUBLE FALLING OR STAYING ASLEEP: NOT AT ALL
SUM OF ALL RESPONSES TO PHQ QUESTIONS 1-9: 3
1. LITTLE INTEREST OR PLEASURE IN DOING THINGS: SEVERAL DAYS
4. FEELING TIRED OR HAVING LITTLE ENERGY: NOT AT ALL
7. TROUBLE CONCENTRATING ON THINGS, SUCH AS READING THE NEWSPAPER OR WATCHING TELEVISION: NOT AT ALL
10. IF YOU CHECKED OFF ANY PROBLEMS, HOW DIFFICULT HAVE THESE PROBLEMS MADE IT FOR YOU TO DO YOUR WORK, TAKE CARE OF THINGS AT HOME, OR GET ALONG WITH OTHER PEOPLE: SOMEWHAT DIFFICULT
6. FEELING BAD ABOUT YOURSELF - OR THAT YOU ARE A FAILURE OR HAVE LET YOURSELF OR YOUR FAMILY DOWN: NOT AT ALL
8. MOVING OR SPEAKING SO SLOWLY THAT OTHER PEOPLE COULD HAVE NOTICED. OR THE OPPOSITE, BEING SO FIGETY OR RESTLESS THAT YOU HAVE BEEN MOVING AROUND A LOT MORE THAN USUAL: SEVERAL DAYS
SUM OF ALL RESPONSES TO PHQ QUESTIONS 1-9: 3
8. MOVING OR SPEAKING SO SLOWLY THAT OTHER PEOPLE COULD HAVE NOTICED. OR THE OPPOSITE - BEING SO FIDGETY OR RESTLESS THAT YOU HAVE BEEN MOVING AROUND A LOT MORE THAN USUAL: SEVERAL DAYS
9. THOUGHTS THAT YOU WOULD BE BETTER OFF DEAD, OR OF HURTING YOURSELF: NOT AT ALL
SUM OF ALL RESPONSES TO PHQ QUESTIONS 1-9: 3
SUM OF ALL RESPONSES TO PHQ QUESTIONS 1-9: 3
2. FEELING DOWN, DEPRESSED OR HOPELESS: SEVERAL DAYS
5. POOR APPETITE OR OVEREATING: NOT AT ALL
4. FEELING TIRED OR HAVING LITTLE ENERGY: NOT AT ALL
SUM OF ALL RESPONSES TO PHQ9 QUESTIONS 1 & 2: 2
3. TROUBLE FALLING OR STAYING ASLEEP: NOT AT ALL
10. IF YOU CHECKED OFF ANY PROBLEMS, HOW DIFFICULT HAVE THESE PROBLEMS MADE IT FOR YOU TO DO YOUR WORK, TAKE CARE OF THINGS AT HOME, OR GET ALONG WITH OTHER PEOPLE: SOMEWHAT DIFFICULT
6. FEELING BAD ABOUT YOURSELF - OR THAT YOU ARE A FAILURE OR HAVE LET YOURSELF OR YOUR FAMILY DOWN: NOT AT ALL
5. POOR APPETITE OR OVEREATING: NOT AT ALL
7. TROUBLE CONCENTRATING ON THINGS, SUCH AS READING THE NEWSPAPER OR WATCHING TELEVISION: NOT AT ALL
SUM OF ALL RESPONSES TO PHQ QUESTIONS 1-9: 3

## 2025-01-14 ENCOUNTER — OFFICE VISIT (OUTPATIENT)
Age: 35
End: 2025-01-14
Payer: COMMERCIAL

## 2025-01-14 VITALS
SYSTOLIC BLOOD PRESSURE: 119 MMHG | TEMPERATURE: 98.7 F | BODY MASS INDEX: 38.81 KG/M2 | HEART RATE: 79 BPM | DIASTOLIC BLOOD PRESSURE: 79 MMHG | OXYGEN SATURATION: 98 % | WEIGHT: 247.8 LBS | RESPIRATION RATE: 17 BRPM

## 2025-01-14 DIAGNOSIS — Z11.3 SCREENING EXAMINATION FOR STD (SEXUALLY TRANSMITTED DISEASE): ICD-10-CM

## 2025-01-14 DIAGNOSIS — R10.2 PELVIC PAIN: ICD-10-CM

## 2025-01-14 DIAGNOSIS — R30.0 DYSURIA: ICD-10-CM

## 2025-01-14 DIAGNOSIS — N89.8 VAGINAL DISCHARGE: ICD-10-CM

## 2025-01-14 DIAGNOSIS — Z01.419 ENCOUNTER FOR GYNECOLOGICAL EXAMINATION: Primary | ICD-10-CM

## 2025-01-14 PROCEDURE — 99385 PREV VISIT NEW AGE 18-39: CPT | Performed by: OBSTETRICS & GYNECOLOGY

## 2025-01-14 NOTE — PROGRESS NOTES
Izabela Ohara is a 34 y.o. female new patient for an annual exam     Chief Complaint   Patient presents with    New Patient    Annual Exam       Problems: The patient is complaining of ovarian  fibroids. Last ultrasound in , two fibroids shown 10 mm x 12 mm x 13 mm & 12 mm x 13 mm x 12 mm. Patient stated that she is having difficulty initiating urination and pain during urination.       OB/GYN History     Hx of STI - No  SA - Yes, Female      Patient's last menstrual period was 2025.  Menses: Irregular without spotting, flow is heavy, usually lasting less than 7 days, and with severe dysmenorrhea  Contraception: None      Health Maintenance  Last Pap:  23,NILM HPV Negative, No history of abnormal pap  With regard to the Gardisil vaccine, she  is unsure.      1. Have you been to the ER, urgent care clinic, or hospitalized since your last visit? New Patient   2. Have you seen or consulted any other health care providers outside of the [facility] since your last visit? New Patient      She declines  a chaperone during the gynecologic exam today.  
High Risk (1/11/2025)    Housing Stability Vital Sign     Unable to Pay for Housing in the Last Year: Yes     Number of Times Moved in the Last Year: 1     Homeless in the Last Year: No       Current Outpatient Medications   Medication Sig Dispense Refill    phentermine 15 MG capsule Take 1 capsule by mouth every morning for 30 days. Max Daily Amount: 15 mg 30 capsule 0    ARIPiprazole (ABILIFY) 5 MG tablet Take 2 tablets by mouth daily 180 tablet 0    hydroxychloroquine (PLAQUENIL) 200 MG tablet Take 1 tablet by mouth daily      azaTHIOprine (IMURAN) 50 MG tablet Take 2 tablets by mouth daily       No current facility-administered medications for this visit.       Allergies   Allergen Reactions    Macrobid [Nitrofurantoin] Other (See Comments)     Vaginal irritation     Penicillins Hives     Other Reaction(s): hives, Unknown    Reaction Type: Allergy       Review of Systems - History obtained from the patient  Constitutional: negative for weight loss, fever, night sweats  HEENT: negative for hearing loss, earache, congestion, snoring, sorethroat  CV: negative for chest pain, palpitations, edema  Resp: negative for cough, shortness of breath, wheezing  GI: negative for change in bowel habits, abdominal pain, black or bloody stools  : negative for frequency, dysuria, hematuria, vaginal discharge  MSK: negative for back pain, joint pain, muscle pain  Breast: negative for breast lumps, nipple discharge, galactorrhea  Skin :negative for itching, rash, hives  Neuro: negative for dizziness, headache, confusion, weakness  Psych: negative for anxiety, depression, change in mood  Heme/lymph: negative for bleeding, bruising, pallor    Physical Exam    /79 (Site: Right Upper Arm, Position: Sitting, Cuff Size: Large Adult)   Pulse 79   Temp 98.7 °F (37.1 °C) (Oral)   Resp 17   Wt 112.4 kg (247 lb 12.8 oz)   LMP 01/03/2025   SpO2 98%   BMI 38.81 kg/m²     Constitutional  Appearance: well-nourished, well developed,

## 2025-01-15 LAB
HBV SURFACE AG SERPL QL IA: NEGATIVE
HCV IGG SERPL QL IA: NON REACTIVE
HIV 1+2 AB+HIV1 P24 AG SERPL QL IA: NON REACTIVE

## 2025-01-15 RX ORDER — IBUPROFEN 800 MG/1
800 TABLET, FILM COATED ORAL 3 TIMES DAILY PRN
Qty: 90 TABLET | Refills: 2 | Status: SHIPPED | OUTPATIENT
Start: 2025-01-15

## 2025-01-16 LAB
A VAGINAE DNA VAG QL NAA+PROBE: NORMAL SCORE
BACTERIA UR CULT: NORMAL
BVAB2 DNA VAG QL NAA+PROBE: NORMAL SCORE
C ALBICANS DNA VAG QL NAA+PROBE: NEGATIVE
C GLABRATA DNA VAG QL NAA+PROBE: NEGATIVE
MEGA1 DNA VAG QL NAA+PROBE: NORMAL SCORE
SPECIMEN STATUS REPORT: NORMAL
TREPONEMA PALLIDUM IGG+IGM AB [PRESENCE] IN SERUM OR PLASMA BY IMMUNOASSAY: NON REACTIVE

## 2025-01-17 LAB
C TRACH RRNA CVX QL NAA+PROBE: NEGATIVE
CYTOLOGIST CVX/VAG CYTO: NORMAL
CYTOLOGY CVX/VAG DOC CYTO: NORMAL
CYTOLOGY CVX/VAG DOC THIN PREP: NORMAL
DX ICD CODE: NORMAL
HPV GENOTYPE REFLEX: NORMAL
HPV I/H RISK 4 DNA CVX QL PROBE+SIG AMP: NEGATIVE
Lab: NORMAL
N GONORRHOEA RRNA CVX QL NAA+PROBE: NEGATIVE
OTHER STN SPEC: NORMAL
STAT OF ADQ CVX/VAG CYTO-IMP: NORMAL
T VAGINALIS RRNA SPEC QL NAA+PROBE: NEGATIVE

## 2025-02-03 ENCOUNTER — OFFICE VISIT (OUTPATIENT)
Age: 35
End: 2025-02-03
Payer: COMMERCIAL

## 2025-02-03 ENCOUNTER — PATIENT MESSAGE (OUTPATIENT)
Age: 35
End: 2025-02-03

## 2025-02-03 VITALS
SYSTOLIC BLOOD PRESSURE: 102 MMHG | DIASTOLIC BLOOD PRESSURE: 66 MMHG | OXYGEN SATURATION: 98 % | BODY MASS INDEX: 39.55 KG/M2 | HEIGHT: 67 IN | WEIGHT: 252 LBS | HEART RATE: 76 BPM | RESPIRATION RATE: 18 BRPM

## 2025-02-03 DIAGNOSIS — F31.9 BIPOLAR 1 DISORDER (HCC): ICD-10-CM

## 2025-02-03 DIAGNOSIS — F32.A ANXIETY AND DEPRESSION: Primary | ICD-10-CM

## 2025-02-03 DIAGNOSIS — F32.A ANXIETY AND DEPRESSION: ICD-10-CM

## 2025-02-03 DIAGNOSIS — E66.9 OBESITY (BMI 30-39.9): ICD-10-CM

## 2025-02-03 DIAGNOSIS — F41.9 ANXIETY AND DEPRESSION: Primary | ICD-10-CM

## 2025-02-03 DIAGNOSIS — E66.9 OBESITY (BMI 30-39.9): Primary | ICD-10-CM

## 2025-02-03 DIAGNOSIS — F41.9 ANXIETY AND DEPRESSION: ICD-10-CM

## 2025-02-03 PROCEDURE — 99214 OFFICE O/P EST MOD 30 MIN: CPT | Performed by: INTERNAL MEDICINE

## 2025-02-03 RX ORDER — ARIPIPRAZOLE 5 MG/1
10 TABLET ORAL DAILY
Qty: 180 TABLET | Refills: 0 | Status: SHIPPED | OUTPATIENT
Start: 2025-02-03 | End: 2025-02-04 | Stop reason: SDUPTHER

## 2025-02-03 RX ORDER — PHENTERMINE HYDROCHLORIDE 15 MG/1
15 CAPSULE ORAL EVERY MORNING
COMMUNITY
End: 2025-02-03

## 2025-02-03 ASSESSMENT — ENCOUNTER SYMPTOMS
RESPIRATORY NEGATIVE: 1
GASTROINTESTINAL NEGATIVE: 1

## 2025-02-03 NOTE — PROGRESS NOTES
\"Have you been to the ER, urgent care clinic since your last visit?  Hospitalized since your last visit?\"    NO    “Have you seen or consulted any other health care providers outside our system since your last visit?”    NO

## 2025-02-03 NOTE — PROGRESS NOTES
Subjective    Izabela Ohara is a 34 y.o. female who presents today for the following:  Chief Complaint   Patient presents with    Discuss Medications       History of Present Illness  The patient presents for a 2-month follow-up on weight loss medication.    She was initially started on Lomaira, which resulted in a 5-pound weight loss, bringing her weight down from 250 to 245 pounds. However, she has since experienced weight gain and is seeking advice on how to manage this. She reports feeling more energetic while on Lomaira compared to phentermine. She has not experienced any side effects from the phentermine but does not believe it is effective for her. She has never received any injections for weight loss. She adheres to a diet that excludes food after 7:00 PM, includes a banana or yogurt for breakfast, and a high-protein, low-carb lunch. She has eliminated soda and chips from her diet until her weight is under control. She maintains adequate hydration by drinking plenty of water. Her bowel movements were regular while on Lomaira, but this has changed recently.    Her mental health has been stable with the use of Abilify 5 mg, taking 2 tablets daily. She experiences occasional low moods, which she attributes to significant life changes, but these are manageable and do not escalate to suicidal ideation. She has identified a therapist and plans to schedule an appointment once her current circumstances stabilize.    She continues to take Plaquenil 200 mg and Imuran 50 mg, 2 tablets daily. She uses ibuprofen as needed for pain management. Her OB/GYN prescribed ibuprofen due to issues with ovarian cysts. She has a follow-up appointment scheduled on 02/24/2025 with Natalie Encarnacion to monitor the cysts.    MEDICATIONS  Current: Abilify, Plaquenil, Imuran, ibuprofen, phentermine.        PMH/PSH/Allergies/Social History/medication list and most recent studies reviewed with patient.     reports that she has never smoked.

## 2025-02-04 DIAGNOSIS — E66.9 OBESITY (BMI 30-39.9): Primary | ICD-10-CM

## 2025-02-04 RX ORDER — PHENTERMINE HYDROCHLORIDE 8 MG/1
1 TABLET ORAL DAILY
Qty: 60 TABLET | Refills: 0 | Status: SHIPPED | OUTPATIENT
Start: 2025-02-04 | End: 2025-04-05

## 2025-02-04 RX ORDER — ARIPIPRAZOLE 5 MG/1
10 TABLET ORAL DAILY
Qty: 180 TABLET | Refills: 0 | Status: SHIPPED | OUTPATIENT
Start: 2025-02-04

## 2025-03-18 DIAGNOSIS — E66.9 OBESITY (BMI 30-39.9): ICD-10-CM

## 2025-03-18 RX ORDER — PHENTERMINE HYDROCHLORIDE 8 MG/1
1 TABLET ORAL DAILY
Qty: 60 TABLET | Refills: 0 | Status: SHIPPED | OUTPATIENT
Start: 2025-03-18 | End: 2025-05-17

## 2025-04-07 ENCOUNTER — OFFICE VISIT (OUTPATIENT)
Age: 35
End: 2025-04-07
Payer: COMMERCIAL

## 2025-04-07 VITALS
DIASTOLIC BLOOD PRESSURE: 78 MMHG | SYSTOLIC BLOOD PRESSURE: 132 MMHG | RESPIRATION RATE: 18 BRPM | TEMPERATURE: 99 F | WEIGHT: 244 LBS | HEIGHT: 67 IN | BODY MASS INDEX: 38.3 KG/M2

## 2025-04-07 DIAGNOSIS — R51.9 GENERALIZED HEADACHE: ICD-10-CM

## 2025-04-07 DIAGNOSIS — Z09 HOSPITAL DISCHARGE FOLLOW-UP: Primary | ICD-10-CM

## 2025-04-07 DIAGNOSIS — A87.9: ICD-10-CM

## 2025-04-07 DIAGNOSIS — Z86.19 HISTORY OF SHINGLES: ICD-10-CM

## 2025-04-07 PROCEDURE — 99214 OFFICE O/P EST MOD 30 MIN: CPT | Performed by: INTERNAL MEDICINE

## 2025-04-07 RX ORDER — METHOCARBAMOL 750 MG/1
750 TABLET, FILM COATED ORAL 4 TIMES DAILY
COMMUNITY
Start: 2025-03-27

## 2025-04-07 RX ORDER — GABAPENTIN 100 MG/1
100 CAPSULE ORAL 2 TIMES DAILY
Qty: 180 CAPSULE | Refills: 1 | Status: SHIPPED | OUTPATIENT
Start: 2025-04-07 | End: 2025-10-04

## 2025-04-07 RX ORDER — VALACYCLOVIR HYDROCHLORIDE 1 G/1
1000 TABLET, FILM COATED ORAL EVERY 8 HOURS PRN
COMMUNITY
Start: 2025-03-27

## 2025-04-07 RX ORDER — BUTALBITAL, ACETAMINOPHEN AND CAFFEINE 50; 325; 40 MG/1; MG/1; MG/1
1 TABLET ORAL EVERY 6 HOURS PRN
Qty: 12 TABLET | Refills: 0 | Status: SHIPPED | OUTPATIENT
Start: 2025-04-07

## 2025-04-07 RX ORDER — ACETAMINOPHEN 325 MG/1
650 TABLET ORAL EVERY 6 HOURS PRN
COMMUNITY

## 2025-04-08 ASSESSMENT — ENCOUNTER SYMPTOMS
BACK PAIN: 1
GASTROINTESTINAL NEGATIVE: 1
COUGH: 1

## 2025-04-08 NOTE — PROGRESS NOTES
Subjective    Izabela Ohara is a 34 y.o. female who presents today for the following:  Chief Complaint   Patient presents with    Follow-Up from Hospital       History of Present Illness  The patient presents for evaluation of viral meningitis, shingles, and lupus.    She was recently hospitalized for 4 days due to a concurrent diagnosis of shingles and viral meningitis, which she believes was secondary to the shingles. She has been absent from work due to severe spinal pain that persisted for approximately 1.5 weeks, followed by the emergence of circular bumps on her body. Upon returning home from work, she experienced an episode of intense head and brain pain, described as a burning sensation, which caused her to lose consciousness. She was transported to the emergency room where a lumbar puncture was performed, revealing varicella zoster was positive.     She suspects that her lupus may have contributed to the rapid progression of these conditions.     A persistent cough, fluctuating blood pressure, and numbness and tingling in her legs are reported. She was initially treated with IV antibiotics but was later switched to oral medication due to her lupus. She was discharged with medications for her cough and a potential pneumonia diagnosis. She also developed a urinary tract infection (UTI) during her hospital stay. Her shingles have resolved, but headaches continue, which are not alleviated by her current medication. She was informed that her meningitis was viral in nature. No known exposure to individuals with meningitis is reported. Fevers continue, with a recorded temperature of 101 degrees last night. She was previously on naproxen for fever management. Current medications include methocarbamol and acetaminophen for headaches, but these have not been effective.    She has a history of lupus.    She has regained some weight after a period of weight loss.    She has a history of dry eye syndrome and reports

## 2025-04-13 ENCOUNTER — PATIENT MESSAGE (OUTPATIENT)
Age: 35
End: 2025-04-13